# Patient Record
Sex: MALE | Race: ASIAN | URBAN - METROPOLITAN AREA
[De-identification: names, ages, dates, MRNs, and addresses within clinical notes are randomized per-mention and may not be internally consistent; named-entity substitution may affect disease eponyms.]

---

## 2017-02-03 ENCOUNTER — OFFICE VISIT (OUTPATIENT)
Dept: FAMILY MEDICINE | Facility: CLINIC | Age: 52
End: 2017-02-03
Payer: COMMERCIAL

## 2017-02-03 VITALS
WEIGHT: 196 LBS | DIASTOLIC BLOOD PRESSURE: 90 MMHG | OXYGEN SATURATION: 98 % | HEIGHT: 70 IN | TEMPERATURE: 97.9 F | SYSTOLIC BLOOD PRESSURE: 138 MMHG | HEART RATE: 70 BPM | BODY MASS INDEX: 28.06 KG/M2

## 2017-02-03 DIAGNOSIS — Z23 NEED FOR PROPHYLACTIC VACCINATION AND INOCULATION AGAINST INFLUENZA: ICD-10-CM

## 2017-02-03 DIAGNOSIS — K21.9 GASTROESOPHAGEAL REFLUX DISEASE WITHOUT ESOPHAGITIS: ICD-10-CM

## 2017-02-03 DIAGNOSIS — I10 ESSENTIAL HYPERTENSION WITH GOAL BLOOD PRESSURE LESS THAN 140/90: Primary | ICD-10-CM

## 2017-02-03 PROCEDURE — 90686 IIV4 VACC NO PRSV 0.5 ML IM: CPT | Performed by: FAMILY MEDICINE

## 2017-02-03 PROCEDURE — 99213 OFFICE O/P EST LOW 20 MIN: CPT | Mod: 25 | Performed by: FAMILY MEDICINE

## 2017-02-03 PROCEDURE — 90471 IMMUNIZATION ADMIN: CPT | Performed by: FAMILY MEDICINE

## 2017-02-03 RX ORDER — ATENOLOL 50 MG/1
50 TABLET ORAL DAILY
Qty: 90 TABLET | Refills: 2 | Status: SHIPPED | OUTPATIENT
Start: 2017-02-03 | End: 2017-08-17

## 2017-02-03 RX ORDER — AMLODIPINE BESYLATE 5 MG/1
7.5 TABLET ORAL DAILY
Qty: 135 TABLET | Refills: 0 | Status: SHIPPED | OUTPATIENT
Start: 2017-02-03 | End: 2017-05-11

## 2017-02-03 NOTE — PROGRESS NOTES
SUBJECTIVE:                                                    Willard Bartlett is a 51 year old male who presents to clinic today for the following health issues:      Hypertension Follow-up      Outpatient blood pressures are not being checked.    Low Salt Diet: no added salt       Amount of exercise or physical activity: 2-3 days/week for an average of greater than 60 minutes    Problems taking medications regularly: No    Medication side effects: none    Diet: regular (no restrictions)    Gerd is stable   The patient denies abdominal or flank pain, anorexia, nausea or vomiting, dysphagia, change in bowel habits or black or bloody stools.      Problem list and histories reviewed & adjusted, as indicated.  Additional history: as documented    Patient Active Problem List   Diagnosis     Hypertriglyceridemia     Neurocysticercosis     CARDIOVASCULAR SCREENING; LDL GOAL LESS THAN 130     Ex-smoker     DDD (degenerative disc disease), lumbar     Essential hypertension with goal blood pressure less than 140/90     Impaired glucose tolerance     Past Surgical History   Procedure Laterality Date     No history of surgery       Colonoscopy with co2 insufflation N/A 9/7/2016     Procedure: COLONOSCOPY WITH CO2 INSUFFLATION;  Surgeon: Duane, William Charles, MD;  Location:  OR       Social History   Substance Use Topics     Smoking status: Former Smoker -- 0.50 packs/day     Types: Cigarettes     Quit date: 03/12/2012     Smokeless tobacco: Never Used     Alcohol Use: No      Comment: prior use of 2-3 whiskey 4 days a week, quit 8/2012     Family History   Problem Relation Age of Onset     C.A.D. Father 68     MI, dZoie 68     Hypertension Father      Hypertension Mother      CEREBROVASCULAR DISEASE Mother      Hypertension Brother      CANCER Brother      Hypertension Brother          Current Outpatient Prescriptions   Medication Sig Dispense Refill     amLODIPine (NORVASC) 5 MG tablet Take 1.5 tablets (7.5 mg) by mouth  daily 135 tablet 0     atenolol (TENORMIN) 50 MG tablet Take 1 tablet (50 mg) by mouth daily 90 tablet 2     omeprazole (PRILOSEC) 20 MG CR capsule Take 1 capsule (20 mg) by mouth daily 90 capsule 0     [DISCONTINUED] amLODIPine (NORVASC) 5 MG tablet 5 mg daily, daily- 90 tablet 3     [DISCONTINUED] atenolol (TENORMIN) 50 MG tablet Take 1 tablet (50 mg) by mouth daily 90 tablet 2     No Known Allergies  Recent Labs   Lab Test  08/01/16   0825  06/24/16   0848  06/14/16   1317  09/15/15   1043  02/23/15   1406  09/25/14   0911  12/09/13   0709   05/12/09   1415   A1C   --    --   6.3*   --    --    --    --    --   5.8   LDL   --   86   --   83   --   111  80   < >   --    HDL   --   28*   --   30*   --   33*  23*   < >   --    TRIG   --   134   --   103   --   97  68   < >   --    ALT  40   --    --    --   34   --   41   < >  22   CR   --    --   0.93  1.08  1.17  0.97  0.99   < >   --    GFRESTIMATED   --    --   86  73  66  82  81   < >   --    GFRESTBLACK   --    --   >90   GFR Calc    88  80  >90   GFR Calc    >90   < >   --    POTASSIUM   --    --   4.6  4.5  3.8  4.4  4.3   < >   --    TSH   --    --    --    --   1.56   --    --    --    --     < > = values in this interval not displayed.      BP Readings from Last 3 Encounters:   02/03/17 138/90   09/07/16 116/88   08/01/16 136/88    Wt Readings from Last 3 Encounters:   02/03/17 196 lb (88.905 kg)   08/01/16 196 lb (88.905 kg)   06/14/16 192 lb (87.091 kg)                  Labs reviewed in EPIC  Problem list, Medication list, Allergies, and Medical/Social/Surgical histories reviewed in [facility] and updated as appropriate.    ROS:  C: NEGATIVE for fever, chills, change in weight  E/M: NEGATIVE for ear, mouth and throat problems  R: NEGATIVE for significant cough or SOB  CV: NEGATIVE for chest pain, palpitations or peripheral edema  GI: NEGATIVE for nausea, abdominal pain, heartburn, or change in bowel habits    OBJECTIVE:          "                                           /90 mmHg  Pulse 70  Temp(Src) 97.9  F (36.6  C) (Oral)  Ht 5' 10\" (1.778 m)  Wt 196 lb (88.905 kg)  BMI 28.12 kg/m2  SpO2 98%  Body mass index is 28.12 kg/(m^2).  GENERAL: healthy, alert and no distress  NECK: no adenopathy, no asymmetry, masses, or scars and thyroid normal to palpation  RESP: lungs clear to auscultation - no rales, rhonchi or wheezes  CV: regular rate and rhythm, normal S1 S2, no S3 or S4, no murmur, click or rub, no peripheral edema and peripheral pulses strong  ABDOMEN: soft, nontender, no hepatosplenomegaly, no masses and bowel sounds normal  MS: no gross musculoskeletal defects noted, no edema    Diagnostic Test Results:       ASSESSMENT/PLAN:                                                      1. Essential hypertension with goal blood pressure less than 140/90  Borderline High  - amLODIPine (NORVASC) 5 MG tablet; Take 1.5 tablets (7.5 mg) by mouth daily  Dispense: 135 tablet; Refill: 0  - atenolol (TENORMIN) 50 MG tablet; Take 1 tablet (50 mg) by mouth daily  Dispense: 90 tablet; Refill: 2  Advised increase amlodipine  SEE [facility] care orders  The potential side effects of this medication have been discussed with the patient.  Call if any significant problems with these are experienced.  Follow up ancillary BP check 2 weeks  2. Gastroesophageal reflux disease without esophagitis  Stable     3. Need for prophylactic vaccination and inoculation against influenza  done  - FLU VAC, SPLIT VIRUS IM > 3 YO (QUADRIVALENT) [96268]  - Vaccine Administration, Initial [71631]  Laure Doan MD  [facility]    "

## 2017-02-03 NOTE — NURSING NOTE
"Chief Complaint   Patient presents with     Recheck Medication     Hypertension       Initial /108 mmHg  Pulse 70  Temp(Src) 97.9  F (36.6  C) (Oral)  Ht 5' 10\" (1.778 m)  Wt 196 lb (88.905 kg)  BMI 28.12 kg/m2  SpO2 98% Estimated body mass index is 28.12 kg/(m^2) as calculated from the following:    Height as of this encounter: 5' 10\" (1.778 m).    Weight as of this encounter: 196 lb (88.905 kg).  BP completed using cuff size: regular right arm    Hortencia Joyce MA      "

## 2017-02-03 NOTE — PROGRESS NOTES
Injectable Influenza Immunization Documentation    1.  Is the person to be vaccinated sick today?  No    2. Does the person to be vaccinated have an allergy to eggs or to a component of the vaccine?  No    3. Has the person to be vaccinated today ever had a serious reaction to influenza vaccine in the past?  No    4. Has the person to be vaccinated ever had Guillain-[place] syndrome?  No     Form completed by Kilo Meyers. MA

## 2017-02-03 NOTE — MR AVS SNAPSHOT
"              After Visit Summary   2/3/2017    Willard Bartlett    MRN: [id number]           Patient Information     Date Of Birth          1965        Visit Information        Provider Department      2/3/2017 9:20 AM Laure Doan MD [facility]        Today's Diagnoses     Need for prophylactic vaccination and inoculation against influenza    -  1     Essential hypertension with goal blood pressure less than 140/90         Abdominal pain, epigastric            Follow-ups after your visit        Who to contact     If you have questions or need follow up information about today's clinic visit or your schedule please contact [facility] directly at [phone].  Normal or non-critical lab and imaging results will be communicated to you by MyChart, letter or phone within 4 business days after the clinic has received the results. If you do not hear from us within 7 days, please contact the clinic through Comcas[employer] or phone. If you have a critical or abnormal lab result, we will notify you by phone as soon as possible.  Submit refill requests through Teachbase or call your pharmacy and they will forward the refill request to us. Please allow 3 business days for your refill to be completed.          Additional Information About Your Visit        MyChart Information     [employer] gives you secure access to your electronic health record. If you see a primary care provider, you can also send messages to your care team and make appointments. If you have questions, please call your primary care clinic.  If you do not have a primary care provider, please call [phone] and they will assist you.        Care EveryWhere ID     This is your Care EveryWhere ID. This could be used by other organizations to access your [facility] medical records  [id number]        Your Vitals Were     Pulse Temperature Height BMI (Body Mass Index) Pulse Oximetry       70 97.9  F (36.6  C) (Oral) 5' 10\" (1.778 m) 28.12 " kg/m2 98%        Blood Pressure from Last 3 Encounters:   02/03/17 136/108   09/07/16 116/88   08/01/16 136/88    Weight from Last 3 Encounters:   02/03/17 196 lb (88.905 kg)   08/01/16 196 lb (88.905 kg)   06/14/16 192 lb (87.091 kg)              Today, you had the following     No orders found for display         Today's Medication Changes          These changes are accurate as of: 2/3/17  9:33 AM.  If you have any questions, ask your nurse or doctor.               These medicines have changed or have updated prescriptions.        Dose/Directions    amLODIPine 5 MG tablet   Commonly known as:  NORVASC   This may have changed:    - how much to take  - how to take this  - when to take this  - additional instructions   Used for:  Essential hypertension with goal blood pressure less than 140/90   Changed by:  Laure Doan MD        Dose:  7.5 mg   Take 1.5 tablets (7.5 mg) by mouth daily   Quantity:  135 tablet   Refills:  0            Where to get your medicines      These medications were sent to [facility] - [place], [facility] [address]     Phone:  [phone]    - amLODIPine 5 MG tablet  - atenolol 50 MG tablet  - omeprazole 20 MG CR capsule             Primary Care Provider Office Phone # Fax #    Laure Doan [phone]       [address] [address]        Thank you!     Thank you for choosing [facility]  for your care. Our goal is always to provide you with excellent care. Hearing back from our patients is one way we can continue to improve our services. Please take a few minutes to complete the written survey that you may receive in the mail after your visit with us. Thank you!             Your Updated Medication List - Protect others around you: Learn how to safely use, store and throw away your medicines at www.disposemymeds.org.          This list is accurate  as of: 2/3/17  9:33 AM.  Always use your most recent med list.                   Brand Name Dispense Instructions for use    amLODIPine 5 MG tablet    NORVASC    135 tablet    Take 1.5 tablets (7.5 mg) by mouth daily       atenolol 50 MG tablet    TENORMIN    90 tablet    Take 1 tablet (50 mg) by mouth daily       omeprazole 20 MG CR capsule    priLOSEC    90 capsule    Take 1 capsule (20 mg) by mouth daily

## 2017-05-11 DIAGNOSIS — I10 ESSENTIAL HYPERTENSION WITH GOAL BLOOD PRESSURE LESS THAN 140/90: ICD-10-CM

## 2017-05-11 RX ORDER — AMLODIPINE BESYLATE 5 MG/1
TABLET ORAL
Qty: 135 TABLET | Refills: 1 | Status: SHIPPED | OUTPATIENT
Start: 2017-05-11 | End: 2017-08-17

## 2017-05-11 NOTE — TELEPHONE ENCOUNTER
amLODIPine (NORVASC) 5 MG tablet     Last Written Prescription Date: 2/3/17  Last Fill Quantity: 135, # refills: 0    Last Office Visit with [facility], [facility] or [facility] prescribing provider:  2/3/17   Future Office Visit:        BP Readings from Last 3 Encounters:   02/03/17 138/90   09/07/16 116/88   08/01/16 136/88

## 2017-05-11 NOTE — TELEPHONE ENCOUNTER
Reason for Call:  Medication or medication refill:    Do you use a [facility]?  Name of the pharmacy and phone number for the current request:  [facility] [phone]    Name of the medication requested: Patient informs he's traveling to [place] on Sunday 05/14/17 for a couple of months and would like refills for the duration of his overseas stay.    Other request: Na    Can we leave a detailed message on this number? YES    Phone number patient can be reached at: Work number on file:  [phone] (work)    Best Time: anytime    Call taken on 5/11/2017 at 9:14 AM by Shyla Connolly

## 2017-05-11 NOTE — TELEPHONE ENCOUNTER
Prescription approved per [facility] Refill Protocol.  Hanny Juárez, RN - BC

## 2017-08-11 ENCOUNTER — TELEPHONE (OUTPATIENT)
Dept: FAMILY MEDICINE | Facility: CLINIC | Age: 52
End: 2017-08-11

## 2017-08-11 NOTE — TELEPHONE ENCOUNTER
Panel Management Review      Patient has the following on his problem list:     Hypertension   Last three blood pressure readings:  BP Readings from Last 3 Encounters:   02/03/17 138/90   09/07/16 116/88   08/01/16 136/88     Blood pressure: Passed    HTN Guidelines:  Age 18-59 BP range:  Less than 140/90  Age 60-85 with Diabetes:  Less than 140/90  Age 60-85 without Diabetes:  less than 150/90        Composite cancer screening  Chart review shows that this patient is due/due soon for the following None  Summary:    Patient is due/failing the following:   none    Action needed:   Routed to provider for review.    Type of outreach:    None, routed to provider for review.    Questions for provider review:    None                                                                                                                                    Odilon Gandara       Chart routed to Care Team .

## 2017-08-11 NOTE — LETTER
August 11, 2017          Willard Bartlett,  [address] [facility] [address]        Dear Willard Bartlett      Monitoring and managing your preventative and chronic health conditions are very important to us. Our records indicate that you have not scheduled for Appointment with your provider  which was recommended by Dr. Doan      If you have received your health care elsewhere, please call the clinic so the information can be documented in your chart.    Please call [phone] or message us through your [employer] account to schedule an appointment or provide information for your chart.     Feel free to contact us if you have any questions or concerns!    I look forward to seeing you and working with you on your health care needs.     Sincerely,         Laure Doan / MD

## 2017-08-17 DIAGNOSIS — I10 ESSENTIAL HYPERTENSION WITH GOAL BLOOD PRESSURE LESS THAN 140/90: ICD-10-CM

## 2017-08-17 NOTE — TELEPHONE ENCOUNTER
atenolol (TENORMIN) 50 MG tablet  Last Written Prescription Date: 2/3/2017  Last Fill Quantity: 90, # refills: 2  Last Office Visit with [facility], [facility] or  Health prescribing provider: 2/3/2017  Next 5 appointments (look out 90 days)     Aug 22, 2017  3:20 PM CDT   Office Visit with Laure Doan MD   [facility] ([facility]    [address] [facility] [address]   [phone]                   Potassium   Date Value Ref Range Status   06/14/2016 4.6 3.4 - 5.3 mmol/L Final     Creatinine   Date Value Ref Range Status   06/14/2016 0.93 0.66 - 1.25 mg/dL Final     BP Readings from Last 3 Encounters:   02/03/17 138/90   09/07/16 116/88   08/01/16 136/88     amLODIPine (NORVASC) 5 MG tablet  Last Written Prescription Date: 5/11/2017  Last Fill Quantity: 135, # refills: 1    Last Office Visit with [facility], [facility] or  Health prescribing provider:  2/3/2017   Future Office Visit:    BP Readings from Last 3 Encounters:   02/03/17 138/90   09/07/16 116/88   08/01/16 136/88

## 2017-08-17 NOTE — TELEPHONE ENCOUNTER
Reason for Call:  Medication or medication refill:    Do you use a [facility]?  Name of the pharmacy and phone number for the current request:  [facility] listed above    Name of the medication requested: Atenolol, amlodipine    Other request: patient only has 3 days worth of medications left.    Can we leave a detailed message on this number? YES    Phone number patient can be reached at: Work number on file:  [phone] (work)    Best Time: any time    Call taken on 8/17/2017 at 1:45 PM by Alessandra Burris

## 2017-08-18 RX ORDER — ATENOLOL 50 MG/1
50 TABLET ORAL DAILY
Qty: 90 TABLET | Refills: 1 | Status: SHIPPED | OUTPATIENT
Start: 2017-08-18 | End: 2017-08-22

## 2017-08-18 RX ORDER — AMLODIPINE BESYLATE 5 MG/1
TABLET ORAL
Qty: 135 TABLET | Refills: 1 | Status: SHIPPED | OUTPATIENT
Start: 2017-08-18 | End: 2017-08-22

## 2017-08-18 NOTE — TELEPHONE ENCOUNTER
Prescription approved per [facility] Refill Protocol.  Hanny Juárez, RN - BC

## 2017-08-22 ENCOUNTER — OFFICE VISIT (OUTPATIENT)
Dept: FAMILY MEDICINE | Facility: CLINIC | Age: 52
End: 2017-08-22
Payer: COMMERCIAL

## 2017-08-22 VITALS
DIASTOLIC BLOOD PRESSURE: 88 MMHG | OXYGEN SATURATION: 97 % | SYSTOLIC BLOOD PRESSURE: 136 MMHG | BODY MASS INDEX: 27.77 KG/M2 | HEIGHT: 70 IN | WEIGHT: 194 LBS | TEMPERATURE: 98 F | HEART RATE: 69 BPM

## 2017-08-22 DIAGNOSIS — I10 ESSENTIAL HYPERTENSION WITH GOAL BLOOD PRESSURE LESS THAN 140/90: ICD-10-CM

## 2017-08-22 DIAGNOSIS — R73.02 IMPAIRED GLUCOSE TOLERANCE: Primary | ICD-10-CM

## 2017-08-22 LAB — HBA1C MFR BLD: 6 % (ref 4.3–6)

## 2017-08-22 PROCEDURE — 99213 OFFICE O/P EST LOW 20 MIN: CPT | Performed by: FAMILY MEDICINE

## 2017-08-22 PROCEDURE — 36415 COLL VENOUS BLD VENIPUNCTURE: CPT | Performed by: FAMILY MEDICINE

## 2017-08-22 PROCEDURE — 80048 BASIC METABOLIC PNL TOTAL CA: CPT | Performed by: FAMILY MEDICINE

## 2017-08-22 PROCEDURE — 83036 HEMOGLOBIN GLYCOSYLATED A1C: CPT | Performed by: FAMILY MEDICINE

## 2017-08-22 RX ORDER — AMLODIPINE BESYLATE 5 MG/1
TABLET ORAL
Qty: 135 TABLET | Refills: 3 | Status: SHIPPED | OUTPATIENT
Start: 2017-08-22 | End: 2018-03-13

## 2017-08-22 RX ORDER — ATENOLOL 50 MG/1
50 TABLET ORAL DAILY
Qty: 90 TABLET | Refills: 1 | Status: SHIPPED | OUTPATIENT
Start: 2017-08-22 | End: 2018-03-13

## 2017-08-22 NOTE — MR AVS SNAPSHOT
After Visit Summary   8/22/2017    Willard Bartlett    MRN: [id number]           Patient Information     Date Of Birth          1965        Visit Information        Provider Department      8/22/2017 3:20 PM Laure Doan MD [facility]        Today's Diagnoses     Impaired glucose tolerance    -  1    Essential hypertension with goal blood pressure less than 140/90          Care Instructions    [facility]    If you have any questions regarding to your visit please contact your care team:       Team Red:   Clinic Hours Telephone Number   Dr. Khadijah Shepherd, NP   7am-7pm  Monday - Thursday   7am-5pm  Fridays  [phone]  (Appointment scheduling available 24/7)    Questions about your visit?   Team Line  [phone]   [facility] [place] [facility] 11am-9pm Monday-Friday Saturday-Sunday- 9am-5pm   [place] - 5pm-9pm Monday-Friday Saturday-Sunday- 9am-5pm  [phone] - [place]   [phone] - [place]       What options do I have for visits at the clinic other than the traditional office visit?  To expand how we care for you, many of our providers are utilizing electronic visits (e-visits) and telephone visits, when medically appropriate, for interactions with their patients rather than a visit in the clinic.   We also offer nurse visits for many medical concerns. Just like any other service, we will bill your insurance company for this type of visit based on time spent on the phone with your provider. Not all insurance companies cover these visits. Please check with your medical insurance if this type of visit is covered. You will be responsible for any charges that are not paid by your insurance.      E-visits via [address]:  generally incur a $35.00 fee.  Telephone visits:  Time spent on the phone: *charged based on time that is spent on the phone in increments of 10 minutes. Estimated  "cost:   5-10 mins $30.00   11-20 mins. $59.00   21-30 mins. $85.00     Use [employer] (secure email communication and access to your chart) to send your primary care provider a message or make an appointment. Ask someone on your Team how to sign up for [employer].  For a Price Quote for your services, please call our [employer] Line at [phone].      As always, Thank you for trusting us with your health care needs!            Follow-ups after your visit        Who to contact     If you have questions or need follow up information about today's clinic visit or your schedule please contact [facility] directly at [phone].  Normal or non-critical lab and imaging results will be communicated to you by [employer], letter or phone within 4 business days after the clinic has received the results. If you do not hear from us within 7 days, please contact the clinic through [employer] or phone. If you have a critical or abnormal lab result, we will notify you by phone as soon as possible.  Submit refill requests through Netlog or call your pharmacy and they will forward the refill request to us. Please allow 3 business days for your refill to be completed.          Additional Information About Your Visit        [employer] Information     Netlog gives you secure access to your electronic health record. If you see a primary care provider, you can also send messages to your care team and make appointments. If you have questions, please call your primary care clinic.  If you do not have a primary care provider, please call [phone] and they will assist you.        Care EveryWhere ID     This is your Care EveryWhere ID. This could be used by other organizations to access your [facility] medical records  [id number]        Your Vitals Were     Pulse Temperature Height Pulse Oximetry BMI (Body Mass Index)       69 98  F (36.7  C) (Oral) 5' 10\" (1.778 m) 97% 27.84 kg/m2        Blood Pressure from Last 3 Encounters: "   08/22/17 136/88   02/03/17 138/90   09/07/16 116/88    Weight from Last 3 Encounters:   08/22/17 194 lb (88 kg)   02/03/17 196 lb (88.9 kg)   08/01/16 196 lb (88.9 kg)              We Performed the Following     Basic metabolic panel     Hemoglobin A1c          Where to get your medicines      These medications were sent to [facility] - [place] [facility] - [address] [facility]  [address] [facility] [address], [place] [address]     Phone:  [phone]     amLODIPine 5 MG tablet    atenolol 50 MG tablet          Primary Care Provider Office Phone # Fax #    Laure Doan [phone] [address] [facility] [address]        Equal Access to Services     [facility]: Hadii davon knowles hadasho Soomaali, waaxda luqadaha, qaybta kaalmada adeegyada, lesli malik . So [facility] [phone].    ATENCIÓN: Si habla español, tiene a etienne disposición servicios gratuitos de asistencia lingüística. José Luis al [phone].    We comply with applicable federal civil rights laws and [place] laws. We do not discriminate on the basis of race, color, national origin, age, disability sex, sexual orientation or gender identity.            Thank you!     Thank you for choosing [facility]  for your care. Our goal is always to provide you with excellent care. Hearing back from our patients is one way we can continue to improve our services. Please take a few minutes to complete the written survey that you may receive in the mail after your visit with us. Thank you!             Your Updated Medication List - Protect others around you: Learn how to safely use, store and throw away your medicines at www.disposemymeds.org.          This list is accurate as of: 8/22/17  4:15 PM.  Always use your most recent med list.                   Brand Name Dispense Instructions for use Diagnosis    amLODIPine 5 MG tablet    NORVASC    135 tablet    TAKE ONE AND ONE-HALF TABLETS BY  MOUTH ONCE DAILY    Essential hypertension with goal blood pressure less than 140/90       atenolol 50 MG tablet    TENORMIN    90 tablet    Take 1 tablet (50 mg) by mouth daily    Essential hypertension with goal blood pressure less than 140/90       omeprazole 20 MG CR capsule    priLOSEC    90 capsule    Take 1 capsule (20 mg) by mouth daily

## 2017-08-22 NOTE — NURSING NOTE
"Chief Complaint   Patient presents with     Hypertension       Initial BP (!) 136/94  Pulse 69  Temp 98  F (36.7  C) (Oral)  Ht 5' 10\" (1.778 m)  Wt 194 lb (88 kg)  SpO2 97%  BMI 27.84 kg/m2 Estimated body mass index is 27.84 kg/(m^2) as calculated from the following:    Height as of this encounter: 5' 10\" (1.778 m).    Weight as of this encounter: 194 lb (88 kg).  Medication Reconciliation: complete   Lis MONTEZ MA      "

## 2017-08-22 NOTE — PATIENT INSTRUCTIONS
[facility]    If you have any questions regarding to your visit please contact your care team:       Team Red:   Clinic Hours Telephone Number   Dr. Khadijah Shepherd, NP   7am-7pm  Monday - Thursday   7am-5pm  Fridays  [phone]  (Appointment scheduling available 24/7)    Questions about your visit?   Team Line  [phone]   [facility] [place] [facility] 11am-9pm Monday-Friday Saturday-Sunday- 9am-5pm   [place] - 5pm-9pm Monday-Friday Saturday-Sunday- 9am-5pm  [phone] - [place]   [phone] - [place]       What options do I have for visits at the clinic other than the traditional office visit?  To expand how we care for you, many of our providers are utilizing electronic visits (e-visits) and telephone visits, when medically appropriate, for interactions with their patients rather than a visit in the clinic.   We also offer nurse visits for many medical concerns. Just like any other service, we will bill your insurance company for this type of visit based on time spent on the phone with your provider. Not all insurance companies cover these visits. Please check with your medical insurance if this type of visit is covered. You will be responsible for any charges that are not paid by your insurance.      E-visits via Yesmail:  generally incur a $35.00 fee.  Telephone visits:  Time spent on the phone: *charged based on time that is spent on the phone in increments of 10 minutes. Estimated cost:   5-10 mins $30.00   11-20 mins. $59.00   21-30 mins. $85.00     Use Avisenat (secure email communication and access to your chart) to send your primary care provider a message or make an appointment. Ask someone on your Team how to sign up for Yesmail.  For a Price Quote for your services, please call our Consumer Price Line at [phone].      As always, Thank you for trusting us with your health care needs!

## 2017-08-22 NOTE — PROGRESS NOTES
SUBJECTIVE:   Willard Bartlett is a 52 year old male who presents to clinic today for the following health issues:      Hypertension Follow-up      Outpatient blood pressures are being checked at clinic [facility].  Results are normal range.    Low Salt Diet: low salt        Amount of exercise or physical activity: None    Problems taking medications regularly: No    Medication side effects: none  Diet: low salt          Problem list and histories reviewed & adjusted, as indicated.  Additional history: as documented    Patient Active Problem List   Diagnosis     Hypertriglyceridemia     Neurocysticercosis     CARDIOVASCULAR SCREENING; LDL GOAL LESS THAN 130     Ex-smoker     DDD (degenerative disc disease), lumbar     Essential hypertension with goal blood pressure less than 140/90     Impaired glucose tolerance     Past Surgical History:   Procedure Laterality Date     COLONOSCOPY WITH CO2 INSUFFLATION N/A 9/7/2016    Procedure: COLONOSCOPY WITH CO2 INSUFFLATION;  Surgeon: Duane, William Charles, MD;  Location:  OR     NO HISTORY OF SURGERY         Social History   Substance Use Topics     Smoking status: Former Smoker     Packs/day: 0.50     Types: Cigarettes     Quit date: 3/12/2012     Smokeless tobacco: Never Used     Alcohol use No      Comment: prior use of 2-3 whiskey 4 days a week, quit 8/2012     Family History   Problem Relation Age of Onset     C.A.D. Father 68     MI, d. 68     Hypertension Father      Hypertension Mother      CEREBROVASCULAR DISEASE Mother      Hypertension Brother      CANCER Brother      Hypertension Brother          Current Outpatient Prescriptions   Medication Sig Dispense Refill     amLODIPine (NORVASC) 5 MG tablet TAKE ONE AND ONE-HALF TABLETS BY MOUTH ONCE DAILY 135 tablet 3     atenolol (TENORMIN) 50 MG tablet Take 1 tablet (50 mg) by mouth daily 90 tablet 1     [DISCONTINUED] amLODIPine (NORVASC) 5 MG tablet TAKE ONE AND ONE-HALF TABLETS BY MOUTH ONCE DAILY 135 tablet 1      "[DISCONTINUED] atenolol (TENORMIN) 50 MG tablet Take 1 tablet (50 mg) by mouth daily 90 tablet 1     omeprazole (PRILOSEC) 20 MG CR capsule Take 1 capsule (20 mg) by mouth daily (Patient not taking: Reported on 8/22/2017) 90 capsule 0     No Known Allergies  Recent Labs   Lab Test  08/01/16   0825  06/24/16   0848  06/14/16   1317  09/15/15   1043  02/23/15   1406  09/25/14   0911  12/09/13   0709   A1C   --    --   6.3*   --    --    --    --    LDL   --   86   --   83   --   111  80   HDL   --   28*   --   30*   --   33*  23*   TRIG   --   134   --   103   --   97  68   ALT  40   --    --    --   34   --   41   CR   --    --   0.93  1.08  1.17  0.97  0.99   GFRESTIMATED   --    --   86  73  66  82  81   GFRESTBLACK   --    --   >90   GFR Calc    88  80  >90   GFR Calc    >90   POTASSIUM   --    --   4.6  4.5  3.8  4.4  4.3   TSH   --    --    --    --   1.56   --    --       BP Readings from Last 3 Encounters:   08/22/17 136/88   02/03/17 138/90   09/07/16 116/88    Wt Readings from Last 3 Encounters:   08/22/17 194 lb (88 kg)   02/03/17 196 lb (88.9 kg)   08/01/16 196 lb (88.9 kg)                  Labs reviewed in EPIC          Reviewed and updated as needed this visit by clinical staffTobacco  Meds  Med Hx  Surg Hx  Fam Hx  Soc Hx      Reviewed and updated as needed this visit by Provider         ROS:  C: NEGATIVE for fever, chills, change in weight  E/M: NEGATIVE for ear, mouth and throat problems  R: NEGATIVE for significant cough or SOB  CV: NEGATIVE for chest pain, palpitations or peripheral edema    OBJECTIVE:     /88  Pulse 69  Temp 98  F (36.7  C) (Oral)  Ht 5' 10\" (1.778 m)  Wt 194 lb (88 kg)  SpO2 97%  BMI 27.84 kg/m2  Body mass index is 27.84 kg/(m^2).  GENERAL: healthy, alert and no distress  NECK: no adenopathy, no asymmetry, masses, or scars and thyroid normal to palpation  RESP: lungs clear to auscultation - no rales, rhonchi or wheezes  CV: regular " rate and rhythm, normal S1 S2, no S3 or S4, no murmur, click or rub, no peripheral edema and peripheral pulses strong  ABDOMEN: soft, nontender, no hepatosplenomegaly, no masses and bowel sounds normal  MS: no gross musculoskeletal defects noted, no edema    Diagnostic Test Results:  Pending     ASSESSMENT/PLAN:         1. Essential hypertension with goal blood pressure less than 140/90  controlled  - amLODIPine (NORVASC) 5 MG tablet; TAKE ONE AND ONE-HALF TABLETS BY MOUTH ONCE DAILY  Dispense: 135 tablet; Refill: 3  - atenolol (TENORMIN) 50 MG tablet; Take 1 tablet (50 mg) by mouth daily  Dispense: 90 tablet; Refill: 1  - Basic metabolic panel    2. Impaired glucose tolerance  done  - Hemoglobin A1c    Follow up 6 months  DASH diet    Laure Doan MD  [facility]

## 2017-08-23 LAB
ANION GAP SERPL CALCULATED.3IONS-SCNC: 9 MMOL/L (ref 3–14)
BUN SERPL-MCNC: 16 MG/DL (ref 7–30)
CALCIUM SERPL-MCNC: 9.1 MG/DL (ref 8.5–10.1)
CHLORIDE SERPL-SCNC: 103 MMOL/L (ref 94–109)
CO2 SERPL-SCNC: 28 MMOL/L (ref 20–32)
CREAT SERPL-MCNC: 0.88 MG/DL (ref 0.66–1.25)
GFR SERPL CREATININE-BSD FRML MDRD: >90 ML/MIN/1.7M2
GLUCOSE SERPL-MCNC: 112 MG/DL (ref 70–99)
POTASSIUM SERPL-SCNC: 4.3 MMOL/L (ref 3.4–5.3)
SODIUM SERPL-SCNC: 140 MMOL/L (ref 133–144)

## 2018-03-13 ENCOUNTER — OFFICE VISIT (OUTPATIENT)
Dept: FAMILY MEDICINE | Facility: CLINIC | Age: 53
End: 2018-03-13
Payer: COMMERCIAL

## 2018-03-13 VITALS
OXYGEN SATURATION: 98 % | HEIGHT: 70 IN | SYSTOLIC BLOOD PRESSURE: 136 MMHG | HEART RATE: 74 BPM | RESPIRATION RATE: 14 BRPM | WEIGHT: 197 LBS | BODY MASS INDEX: 28.2 KG/M2 | TEMPERATURE: 97 F | DIASTOLIC BLOOD PRESSURE: 88 MMHG

## 2018-03-13 DIAGNOSIS — I10 ESSENTIAL HYPERTENSION WITH GOAL BLOOD PRESSURE LESS THAN 140/90: Primary | ICD-10-CM

## 2018-03-13 DIAGNOSIS — Z23 NEED FOR PROPHYLACTIC VACCINATION AND INOCULATION AGAINST INFLUENZA: ICD-10-CM

## 2018-03-13 DIAGNOSIS — Z87.891 EX-SMOKER: ICD-10-CM

## 2018-03-13 PROCEDURE — 99213 OFFICE O/P EST LOW 20 MIN: CPT | Mod: 25 | Performed by: FAMILY MEDICINE

## 2018-03-13 PROCEDURE — 90471 IMMUNIZATION ADMIN: CPT | Performed by: FAMILY MEDICINE

## 2018-03-13 PROCEDURE — 90686 IIV4 VACC NO PRSV 0.5 ML IM: CPT | Performed by: FAMILY MEDICINE

## 2018-03-13 RX ORDER — AMLODIPINE BESYLATE 5 MG/1
TABLET ORAL
Qty: 135 TABLET | Refills: 3 | Status: SHIPPED | OUTPATIENT
Start: 2018-03-13 | End: 2019-01-08

## 2018-03-13 RX ORDER — ATENOLOL 50 MG/1
50 TABLET ORAL DAILY
Qty: 90 TABLET | Refills: 3 | Status: SHIPPED | OUTPATIENT
Start: 2018-03-13 | End: 2019-01-08

## 2018-03-13 NOTE — NURSING NOTE
"Chief Complaint   Patient presents with     Hypertension     Flu Shot       Initial /90  Pulse 74  Temp 97  F (36.1  C)  Resp 14  Ht 5' 10\" (1.778 m)  Wt 197 lb (89.4 kg)  SpO2 98%  BMI 28.27 kg/m2 Estimated body mass index is 28.27 kg/(m^2) as calculated from the following:    Height as of this encounter: 5' 10\" (1.778 m).    Weight as of this encounter: 197 lb (89.4 kg).  Medication Reconciliation: complete     Kilo Meyers. MA      "

## 2018-03-13 NOTE — MR AVS SNAPSHOT
"              After Visit Summary   3/13/2018    Willard Bartlett    MRN: [id number]           Patient Information     Date Of Birth          1965        Visit Information        Provider Department      3/13/2018 5:00 PM Laure Doan MD [facility]        Today's Diagnoses     Essential hypertension with goal blood pressure less than 140/90    -  1    Need for prophylactic vaccination and inoculation against influenza        Ex-smoker           Follow-ups after your visit        Who to contact     If you have questions or need follow up information about today's clinic visit or your schedule please contact [facility] directly at [phone].  Normal or non-critical lab and imaging results will be communicated to you by Grabithart, letter or phone within 4 business days after the clinic has received the results. If you do not hear from us within 7 days, please contact the clinic through Grabithart or phone. If you have a critical or abnormal lab result, we will notify you by phone as soon as possible.  Submit refill requests through ProtAb or call your pharmacy and they will forward the refill request to us. Please allow 3 business days for your refill to be completed.          Additional Information About Your Visit        MyChart Information     ProtAb gives you secure access to your electronic health record. If you see a primary care provider, you can also send messages to your care team and make appointments. If you have questions, please call your primary care clinic.  If you do not have a primary care provider, please call [phone] and they will assist you.        Care EveryWhere ID     This is your Care EveryWhere ID. This could be used by other organizations to access your [place] medical records  [id number]        Your Vitals Were     Pulse Temperature Respirations Height Pulse Oximetry BMI (Body Mass Index)    74 97  F (36.1  C) 14 5' 10\" (1.778 m) 98% 28.27 kg/m2    "    Blood Pressure from Last 3 Encounters:   03/13/18 134/90   08/22/17 136/88   02/03/17 138/90    Weight from Last 3 Encounters:   03/13/18 197 lb (89.4 kg)   08/22/17 194 lb (88 kg)   02/03/17 196 lb (88.9 kg)              We Performed the Following     FLU VAC, SPLIT VIRUS IM > 3 YO (QUADRIVALENT) [47109]     Vaccine Administration, Initial [65137]          Where to get your medicines      These medications were sent to [facility] - [place] - [address]  [address], [place] [address]     Phone:  [phone]     amLODIPine 5 MG tablet    atenolol 50 MG tablet          Primary Care Provider Office Phone # Fax #    Laure Doan [phone] [address] [address]        Equal Access to Services     [facility] Center[facility] AH: Hadii davon knowles hadasho Soisraelali, waaxda luqadaha, qaybta kaalmada adeegyada, lesli malik . So [facility] [place] [phone].    ATENCIÓN: Si habla español, tiene a etienne disposición servicios gratuitos de asistencia lingüística. Llame al [phone].    We comply with applicable federal civil rights laws and [place] laws. We do not discriminate on the basis of race, color, national origin, age, disability, sex, sexual orientation, or gender identity.            Thank you!     Thank you for choosing [facility]  for your care. Our goal is always to provide you with excellent care. Hearing back from our patients is one way we can continue to improve our services. Please take a few minutes to complete the written survey that you may receive in the mail after your visit with us. Thank you!             Your Updated Medication List - Protect others around you: Learn how to safely use, store and throw away your medicines at www.disposemymeds.org.          This list is accurate as of 3/13/18  5:07 PM.  Always use your most recent med list.                   Brand Name Dispense Instructions for  use Diagnosis    amLODIPine 5 MG tablet    NORVASC    135 tablet    TAKE ONE AND ONE-HALF TABLETS BY MOUTH ONCE DAILY    Essential hypertension with goal blood pressure less than 140/90       atenolol 50 MG tablet    TENORMIN    90 tablet    Take 1 tablet (50 mg) by mouth daily    Essential hypertension with goal blood pressure less than 140/90       omeprazole 20 MG CR capsule    priLOSEC    90 capsule    Take 1 capsule (20 mg) by mouth daily

## 2018-03-13 NOTE — PROGRESS NOTES
SUBJECTIVE:   Willard Bartlett is a 52 year old male who presents to clinic today for the following health issues:      Hypertension Follow-up      Outpatient blood pressures are being checked at home.  Results are normal.    Low Salt Diet: no added salt      Amount of exercise or physical activity: [employer]    Problems taking medications regularly: No    Medication side effects: none    Diet: regular (no restrictions)            Problem list and histories reviewed & adjusted, as indicated.  Additional history: as documented    Patient Active Problem List   Diagnosis     Hypertriglyceridemia     Neurocysticercosis     CARDIOVASCULAR SCREENING; LDL GOAL LESS THAN 130     Ex-smoker     DDD (degenerative disc disease), lumbar     Essential hypertension with goal blood pressure less than 140/90     Impaired glucose tolerance     Past Surgical History:   Procedure Laterality Date     COLONOSCOPY WITH CO2 INSUFFLATION N/A 9/7/2016    Procedure: COLONOSCOPY WITH CO2 INSUFFLATION;  Surgeon: Duane, William Charles, MD;  Location:  OR     NO HISTORY OF SURGERY         Social History   Substance Use Topics     Smoking status: Former Smoker     Packs/day: 0.50     Types: Cigarettes     Quit date: 3/12/2012     Smokeless tobacco: Never Used     Alcohol use No      Comment: prior use of 2-3 whiskey 4 days a week, quit 8/2012     Family History   Problem Relation Age of Onset     C.A.D. Father 68     MI, d. 68     Hypertension Father      Hypertension Mother      CEREBROVASCULAR DISEASE Mother      Hypertension Brother      CANCER Brother      Hypertension Brother          Current Outpatient Prescriptions   Medication Sig Dispense Refill     amLODIPine (NORVASC) 5 MG tablet TAKE ONE AND ONE-HALF TABLETS BY MOUTH ONCE DAILY 135 tablet 3     atenolol (TENORMIN) 50 MG tablet Take 1 tablet (50 mg) by mouth daily 90 tablet 3     [DISCONTINUED] amLODIPine (NORVASC) 5 MG tablet TAKE ONE AND ONE-HALF TABLETS BY MOUTH ONCE DAILY  "135 tablet 3     [DISCONTINUED] atenolol (TENORMIN) 50 MG tablet Take 1 tablet (50 mg) by mouth daily 90 tablet 1     omeprazole (PRILOSEC) 20 MG CR capsule Take 1 capsule (20 mg) by mouth daily (Patient not taking: Reported on 8/22/2017) 90 capsule 0     No Known Allergies  Recent Labs   Lab Test  08/22/17   1619  08/01/16   0825  06/24/16   0848  06/14/16   1317  09/15/15   1043  02/23/15   1406  09/25/14   0911  12/09/13   0709   A1C  6.0   --    --   6.3*   --    --    --    --    LDL   --    --   86   --   83   --   111  80   HDL   --    --   28*   --   30*   --   33*  23*   TRIG   --    --   134   --   103   --   97  68   ALT   --   40   --    --    --   34   --   41   CR  0.88   --    --   0.93  1.08  1.17  0.97  0.99   GFRESTIMATED  >90   --    --   86  73  66  82  81   GFRESTBLACK  >90   --    --   >90   GFR Calc    88  80  >90   GFR Calc    >90   POTASSIUM  4.3   --    --   4.6  4.5  3.8  4.4  4.3   TSH   --    --    --    --    --   1.56   --    --       BP Readings from Last 3 Encounters:   03/13/18 136/88   08/22/17 136/88   02/03/17 138/90    Wt Readings from Last 3 Encounters:   03/13/18 197 lb (89.4 kg)   08/22/17 194 lb (88 kg)   02/03/17 196 lb (88.9 kg)                  Labs reviewed in EPIC    Reviewed and updated as needed this visit by clinical staff  Tobacco  Allergies  Meds       Reviewed and updated as needed this visit by Provider  Tobacco  Allergies  Meds         ROS:  CONSTITUTIONAL: NEGATIVE for fever, chills, change in weight  ENT/MOUTH: NEGATIVE for ear, mouth and throat problems  RESP: NEGATIVE for significant cough or SOB  CV: NEGATIVE for chest pain, palpitations or peripheral edema  GI: NEGATIVE for nausea, abdominal pain, heartburn, or change in bowel habits    OBJECTIVE:     /88  Pulse 74  Temp 97  F (36.1  C)  Resp 14  Ht 5' 10\" (1.778 m)  Wt 197 lb (89.4 kg)  SpO2 98%  BMI 28.27 kg/m2  Body mass index is 28.27 " "kg/(m^2).  GENERAL: healthy, alert and no distress  NECK: no adenopathy, no asymmetry, masses, or scars and thyroid normal to palpation  RESP: lungs clear to auscultation - no rales, rhonchi or wheezes  CV: regular rate and rhythm, normal S1 S2, no S3 or S4, no murmur, click or rub, no peripheral edema and peripheral pulses strong  ABDOMEN: soft, nontender, no hepatosplenomegaly, no masses and bowel sounds normal  MS: no gross musculoskeletal defects noted, no edema    Diagnostic Test Results:  none     ASSESSMENT/PLAN:         BMI:   Estimated body mass index is 28.27 kg/(m^2) as calculated from the following:    Height as of this encounter: 5' 10\" (1.778 m).    Weight as of this encounter: 197 lb (89.4 kg).   Weight management plan: low yany diet      1. Essential hypertension with goal blood pressure less than 140/90  Stable   - amLODIPine (NORVASC) 5 MG tablet; TAKE ONE AND ONE-HALF TABLETS BY MOUTH ONCE DAILY  Dispense: 135 tablet; Refill: 3  - atenolol (TENORMIN) 50 MG tablet; Take 1 tablet (50 mg) by mouth daily  Dispense: 90 tablet; Refill: 3    2. Need for prophylactic vaccination and inoculation against influenza  Advised   - FLU VAC, SPLIT VIRUS IM > 3 YO (QUADRIVALENT) [39125]  - Vaccine Administration, Initial [42759]    3. Ex-smoker    Follow up 6 months  Laure Doan MD  [facility]    Injectable Influenza Immunization Documentation    1.  Is the person to be vaccinated sick today?   No    2. Does the person to be vaccinated have an allergy to a component   of the vaccine?   No  Egg Allergy Algorithm Link    3. Has the person to be vaccinated ever had a serious reaction   to influenza vaccine in the past?   No    4. Has the person to be vaccinated ever had Guillain-Barré syndrome?   No    Form completed by Kilo Meyers. MA           "

## 2018-08-07 ENCOUNTER — OFFICE VISIT (OUTPATIENT)
Dept: FAMILY MEDICINE | Facility: CLINIC | Age: 53
End: 2018-08-07
Payer: COMMERCIAL

## 2018-08-07 VITALS
OXYGEN SATURATION: 97 % | DIASTOLIC BLOOD PRESSURE: 84 MMHG | TEMPERATURE: 97.5 F | SYSTOLIC BLOOD PRESSURE: 128 MMHG | HEART RATE: 64 BPM | BODY MASS INDEX: 29.2 KG/M2 | RESPIRATION RATE: 13 BRPM | WEIGHT: 204 LBS | HEIGHT: 70 IN

## 2018-08-07 DIAGNOSIS — H93.8X2 PLUGGED FEELING IN EAR, LEFT: Primary | ICD-10-CM

## 2018-08-07 PROCEDURE — 99212 OFFICE O/P EST SF 10 MIN: CPT | Performed by: FAMILY MEDICINE

## 2018-08-07 NOTE — PROGRESS NOTES
SUBJECTIVE:   Willard Bartlett is a 53 year old male who presents to clinic today for the following health issues:        Chief Complaint   Patient presents with     Ear Problem     hit with volleyball in left ear ( day) feel like it plugged or less hearing     No drainage        Problem list and histories reviewed & adjusted, as indicated.  Additional history: as documented    Patient Active Problem List   Diagnosis     Hypertriglyceridemia     Neurocysticercosis     CARDIOVASCULAR SCREENING; LDL GOAL LESS THAN 130     Ex-smoker     DDD (degenerative disc disease), lumbar     Essential hypertension with goal blood pressure less than 140/90     Impaired glucose tolerance     Past Surgical History:   Procedure Laterality Date     COLONOSCOPY WITH CO2 INSUFFLATION N/A 9/7/2016    Procedure: COLONOSCOPY WITH CO2 INSUFFLATION;  Surgeon: Duane, William Charles, MD;  Location: [facility] OR     NO HISTORY OF SURGERY         Social History   Substance Use Topics     Smoking status: Former Smoker     Packs/day: 0.50     Types: Cigarettes     Quit date: 3/12/2012     Smokeless tobacco: Never Used     Alcohol use No      Comment: prior use of 2-3 whiskey 4 days a week, quit 8/2012     Family History   Problem Relation Age of Onset     C.A.D. Father 68     MI, dZoie 68     Hypertension Father      Hypertension Mother      Cerebrovascular Disease Mother      Hypertension Brother      Cancer Brother      Hypertension Brother          Current Outpatient Prescriptions   Medication Sig Dispense Refill     amLODIPine (NORVASC) 5 MG tablet TAKE ONE AND ONE-HALF TABLETS BY MOUTH ONCE DAILY 135 tablet 3     atenolol (TENORMIN) 50 MG tablet Take 1 tablet (50 mg) by mouth daily 90 tablet 3     omeprazole (PRILOSEC) 20 MG CR capsule Take 1 capsule (20 mg) by mouth daily 90 capsule 0     No Known Allergies  Recent Labs   Lab Test  08/22/17   1619  08/01/16   0825  06/24/16   0848  06/14/16   1317  09/15/15   1043  02/23/15   1406  09/25/14   0911   "12/09/13   0709   A1C  6.0   --    --   6.3*   --    --    --    --    LDL   --    --   86   --   83   --   111  80   HDL   --    --   28*   --   30*   --   33*  23*   TRIG   --    --   134   --   103   --   97  68   ALT   --   40   --    --    --   34   --   41   CR  0.88   --    --   0.93  1.08  1.17  0.97  0.99   GFRESTIMATED  >90   --    --   86  73  66  82  81   GFRESTBLACK  >90   --    --   >90   GFR Calc    88  80  >90   GFR Calc    >90   POTASSIUM  4.3   --    --   4.6  4.5  3.8  4.4  4.3   TSH   --    --    --    --    --   1.56   --    --       BP Readings from Last 3 Encounters:   08/07/18 128/84   03/13/18 136/88   08/22/17 136/88    Wt Readings from Last 3 Encounters:   08/07/18 204 lb (92.5 kg)   03/13/18 197 lb (89.4 kg)   08/22/17 194 lb (88 kg)                  Labs reviewed in EPIC    Reviewed and updated as needed this visit by clinical staff       Reviewed and updated as needed this visit by Provider         ROS:  CONSTITUTIONAL: NEGATIVE for fever, chills, change in weight  INTEGUMENTARY/SKIN: NEGATIVE for worrisome rashes, moles or lesions  ENT/MOUTH: as above  RESP: NEGATIVE for significant cough or SOB    OBJECTIVE:     /84  Pulse 64  Temp 97.5  F (36.4  C)  Resp 13  Ht 5' 10\" (1.778 m)  Wt 204 lb (92.5 kg)  SpO2 97%  BMI 29.27 kg/m2  Body mass index is 29.27 kg/(m^2).  GENERAL: healthy, alert and no distress  HENT: ear canals and TM's normal, nose and mouth without ulcers or lesions    NECK: no adenopathy, no asymmetry, masses, or scars and thyroid normal to palpation  RESP: lungs clear to auscultation - no rales, rhonchi or wheezes    Diagnostic Test Results:  none     ASSESSMENT/PLAN:     Left ear is Plugged  Comment: advised advil/ice  Plan: follow up if not better        Laure Doan MD  [facility]    "

## 2018-08-07 NOTE — MR AVS SNAPSHOT
After Visit Summary   8/7/2018    Willard Bartlett    MRN: [id number]           Patient Information     Date Of Birth          1965        Visit Information        Provider Department      8/7/2018 12:30 PM Laure Doan MD [facility]        Today's Diagnoses     Screening for HIV (human immunodeficiency virus)    -  1      Care Instructions      [facility]    If you have any questions regarding to your visit please contact your care team:       Team Red:   Clinic Hours Telephone Number   Dr. Khadijah Shepherd, NP   7am-7pm  Monday - Thursday   7am-5pm  Fridays  [phone]  (Appointment scheduling available 24/7)    Questions about your recent visit?   Team Line  [phone]   Urgent Care - [place] and [facility] - 11am-9pm Monday-Friday Saturday-Sunday- 9am-5pm   [place] - 5pm-9pm Monday-Friday Saturday-Sunday- 9am-5pm  [phone] - [place]  [phone] - [place]       What options do I have for a visit other than an office visit? We offer electronic visits (e-visits) and telephone visits, when medically appropriate.  Please check with your medical insurance to see if these types of visits are covered, as you will be responsible for any charges that are not paid by your insurance.      You can use [employer] (secure electronic communication) to access to your chart, send your primary care provider a message, or make an appointment. Ask a team member how to get started.     For a price quote for your services, please call our Consumer Price Line at [phone] or our Imaging Cost estimation line at [phone] (for imaging tests).    Earache, No Infection (Adult)  Earaches can happen without an infection. This occurs when air and fluid build up behind the eardrum causing a feeling of fullness and discomfort and reduced hearing. This is called otitis media with effusion (OME) or serous otitis  media. It means there is fluid in the middle ear. It is not the same as acute otitis media, which is typically from infection.  OME can happen when you have a cold if congestion blocks the passage that drains the middle ear. This passage is called the eustachian tube. OME may also occur with nasal allergies or after a bacterial middle ear infection.    The pain or discomfort may come and go. You may hear clicking or popping sounds when you chew or swallow. You may feel that your balance is off. Or you may hear ringing in the ear.  It often takes from several weeks up to 3 months for the fluid to clear on its own. Oral pain relievers and ear drops help if there is pain. Decongestants and antihistamines sometimes help. Antibiotics don't help since there is no infection. Your doctor may prescribe a nasal spray to help reduce swelling in the nose and eustachian tube. This can allow the ear to drain.  If your OME doesn't improve after 3 months, surgery may be used to drain the fluid and insert a small tube in the eardrum to allow continued drainage.  Because the middle ear fluid can become infected, it is important to watch for signs of an ear infection which may develop later. These signs include increased ear pain, fever, or drainage from the ear.  Home care  The following guidelines will help you care for yourself at home:    You may use over-the-counter medicine as directed to control pain, unless another medicine was prescribed. If you have chronic liver or kidney disease or ever had a stomach ulcer or GI bleeding, talk with your doctor before using these medicines. Aspirin should never be used in anyone under 18 years of age who is ill with a fever. It may cause severe liver damage.    You may use over-the-counter decongestants such as phenylephrine or pseudoephedrine. But they are not always helpful. Don't use nasal spray decongestants more than 3 days. Longer use can make congestion worse. Prescription nasal  sprays from your doctor don't typically have those restrictions.    Antihistamines may help if you are also having allergy symptoms.    You may use medicines such as guaifenesin to thin mucus and promote drainage.  Follow-up care  Follow up with your healthcare provider or as advised if you are not feeling better after 3 days.  When to seek medical advice  Call your healthcare provider right away if any of the following occur:    Your ear pain gets worse or does not start to improve     Fever of 100.4 F (38 C) or higher, or as directed by your healthcare provider    Fluid or blood draining from the ear    Headache or sinus pain    Stiff neck    Unusual drowsiness or confusion  Date Last Reviewed: 10/1/2016    0269-0774 [employer]. [address] [place] [address], [place] [address]. All rights reserved. This information is not intended as a substitute for professional medical care. Always follow your healthcare professional's instructions.                Follow-ups after your visit        Who to contact     If you have questions or need follow up information about today's clinic visit or your schedule please contact [facility] directly at [phone].  Normal or non-critical lab and imaging results will be communicated to you by [employer], letter or phone within 4 business days after the clinic has received the results. If you do not hear from us within 7 days, please contact the clinic through [employer] or phone. If you have a critical or abnormal lab result, we will notify you by phone as soon as possible.  Submit refill requests through [employer] or call your pharmacy and they will forward the refill request to us. Please allow 3 business days for your refill to be completed.          Additional Information About Your Visit        [employer] Information     [employer] gives you secure access to your electronic health record. If you see a primary care provider, you can also send messages to your care team  "and make appointments. If you have questions, please call your primary care clinic.  If you do not have a primary care provider, please call [phone] and they will assist you.        Care EveryWhere ID     This is your Care EveryWhere ID. This could be used by other organizations to access your [place] medical records  [id number]        Your Vitals Were     Pulse Temperature Respirations Height Pulse Oximetry BMI (Body Mass Index)    64 97.5  F (36.4  C) 13 5' 10\" (1.778 m) 97% 29.27 kg/m2       Blood Pressure from Last 3 Encounters:   08/07/18 128/84   03/13/18 136/88   08/22/17 136/88    Weight from Last 3 Encounters:   08/07/18 204 lb (92.5 kg)   03/13/18 197 lb (89.4 kg)   08/22/17 194 lb (88 kg)              Today, you had the following     No orders found for display       Primary Care Provider Office Phone # Fax #    Laure Doan [phone] [address] [facility] [address]        Equal Access to Services     [facility]: Hadii davon knowles hadekaterina Solee, waaxda luclay, qaybta kaalpretty díaz, lesli malik . [facility] [phone].    ATENCIÓN: Si habla español, tiene a etienne disposición servicios gratuitos de asistencia lingüística. [facility] [phone].    We comply with applicable federal civil rights laws and [place] laws. We do not discriminate on the basis of race, color, national origin, age, disability, sex, sexual orientation, or gender identity.            Thank you!     Thank you for choosing [facility]  for your care. Our goal is always to provide you with excellent care. Hearing back from our patients is one way we can continue to improve our services. Please take a few minutes to complete the written survey that you may receive in the mail after your visit with us. Thank you!             Your Updated Medication List - Protect others around you: Learn how to safely use, store and throw away your medicines at " www.disposemymeds.org.          This list is accurate as of 8/7/18 12:54 PM.  Always use your most recent med list.                   Brand Name Dispense Instructions for use Diagnosis    amLODIPine 5 MG tablet    NORVASC    135 tablet    TAKE ONE AND ONE-HALF TABLETS BY MOUTH ONCE DAILY    Essential hypertension with goal blood pressure less than 140/90       atenolol 50 MG tablet    TENORMIN    90 tablet    Take 1 tablet (50 mg) by mouth daily    Essential hypertension with goal blood pressure less than 140/90       omeprazole 20 MG CR capsule    priLOSEC    90 capsule    Take 1 capsule (20 mg) by mouth daily

## 2018-08-07 NOTE — PATIENT INSTRUCTIONS
[facility]    If you have any questions regarding to your visit please contact your care team:       Team Red:   Clinic Hours Telephone Number   Dr. Khadijah Shepherd, NP   7am-7pm  Monday - Thursday   7am-5pm  Fridays  [phone]  (Appointment scheduling available 24/7)    Questions about your recent visit?   Team Line  [phone]   Urgent Care - [place] and [facility] - 11am-9pm Monday-Friday Saturday-Sunday- 9am-5pm   [place] - 5pm-9pm Monday-Friday Saturday-Sunday- 9am-5pm  [phone] - [place]  [phone] - [place]       What options do I have for a visit other than an office visit? We offer electronic visits (e-visits) and telephone visits, when medically appropriate.  Please check with your medical insurance to see if these types of visits are covered, as you will be responsible for any charges that are not paid by your insurance.      You can use [employer] (secure electronic communication) to access to your chart, send your primary care provider a message, or make an appointment. Ask a team member how to get started.     For a price quote for your services, please call our Consumer Price Line at [phone] or our Imaging Cost estimation line at [phone] (for imaging tests).    Earache, No Infection (Adult)  Earaches can happen without an infection. This occurs when air and fluid build up behind the eardrum causing a feeling of fullness and discomfort and reduced hearing. This is called otitis media with effusion (OME) or serous otitis media. It means there is fluid in the middle ear. It is not the same as acute otitis media, which is typically from infection.  OME can happen when you have a cold if congestion blocks the passage that drains the middle ear. This passage is called the eustachian tube. OME may also occur with nasal allergies or after a bacterial middle ear infection.    The pain or discomfort may  come and go. You may hear clicking or popping sounds when you chew or swallow. You may feel that your balance is off. Or you may hear ringing in the ear.  It often takes from several weeks up to 3 months for the fluid to clear on its own. Oral pain relievers and ear drops help if there is pain. Decongestants and antihistamines sometimes help. Antibiotics don't help since there is no infection. Your doctor may prescribe a nasal spray to help reduce swelling in the nose and eustachian tube. This can allow the ear to drain.  If your OME doesn't improve after 3 months, surgery may be used to drain the fluid and insert a small tube in the eardrum to allow continued drainage.  Because the middle ear fluid can become infected, it is important to watch for signs of an ear infection which may develop later. These signs include increased ear pain, fever, or drainage from the ear.  Home care  The following guidelines will help you care for yourself at home:    You may use over-the-counter medicine as directed to control pain, unless another medicine was prescribed. If you have chronic liver or kidney disease or ever had a stomach ulcer or GI bleeding, talk with your doctor before using these medicines. Aspirin should never be used in anyone under 18 years of age who is ill with a fever. It may cause severe liver damage.    You may use over-the-counter decongestants such as phenylephrine or pseudoephedrine. But they are not always helpful. Don't use nasal spray decongestants more than 3 days. Longer use can make congestion worse. Prescription nasal sprays from your doctor don't typically have those restrictions.    Antihistamines may help if you are also having allergy symptoms.    You may use medicines such as guaifenesin to thin mucus and promote drainage.  Follow-up care  Follow up with your healthcare provider or as advised if you are not feeling better after 3 days.  When to seek medical advice  Call your healthcare  provider right away if any of the following occur:    Your ear pain gets worse or does not start to improve     Fever of 100.4 F (38 C) or higher, or as directed by your healthcare provider    Fluid or blood draining from the ear    Headache or sinus pain    Stiff neck    Unusual drowsiness or confusion  Date Last Reviewed: 10/1/2016    6581-2818 [employer]. [address] [place] [address]. All rights reserved. This information is not intended as a substitute for professional medical care. Always follow your healthcare professional's instructions.

## 2019-01-05 ENCOUNTER — ANCILLARY PROCEDURE (OUTPATIENT)
Dept: GENERAL RADIOLOGY | Facility: CLINIC | Age: 54
End: 2019-01-05
Payer: COMMERCIAL

## 2019-01-05 ENCOUNTER — OFFICE VISIT (OUTPATIENT)
Dept: URGENT CARE | Facility: URGENT CARE | Age: 54
End: 2019-01-05
Payer: COMMERCIAL

## 2019-01-05 VITALS
SYSTOLIC BLOOD PRESSURE: 146 MMHG | TEMPERATURE: 98.1 F | BODY MASS INDEX: 28.55 KG/M2 | DIASTOLIC BLOOD PRESSURE: 95 MMHG | OXYGEN SATURATION: 99 % | WEIGHT: 199 LBS | HEART RATE: 71 BPM

## 2019-01-05 DIAGNOSIS — S79.912A HIP INJURY, LEFT, INITIAL ENCOUNTER: ICD-10-CM

## 2019-01-05 DIAGNOSIS — S79.912A HIP INJURY, LEFT, INITIAL ENCOUNTER: Primary | ICD-10-CM

## 2019-01-05 PROCEDURE — 99213 OFFICE O/P EST LOW 20 MIN: CPT | Performed by: PHYSICIAN ASSISTANT

## 2019-01-05 PROCEDURE — 73502 X-RAY EXAM HIP UNI 2-3 VIEWS: CPT

## 2019-01-05 RX ORDER — METHOCARBAMOL 750 MG/1
750 TABLET, FILM COATED ORAL
COMMUNITY
Start: 2010-11-30 | End: 2019-01-10

## 2019-01-05 RX ORDER — OXYCODONE AND ACETAMINOPHEN 5; 325 MG/1; MG/1
1-2 TABLET ORAL
COMMUNITY
Start: 2010-11-30 | End: 2019-01-10

## 2019-01-05 RX ORDER — IBUPROFEN 800 MG/1
TABLET, FILM COATED ORAL
COMMUNITY
Start: 2007-08-23 | End: 2019-01-10

## 2019-01-05 RX ORDER — ASPIRIN 81 MG/1
81 TABLET, CHEWABLE ORAL
COMMUNITY
End: 2019-01-10

## 2019-01-05 RX ORDER — IBUPROFEN 200 MG
200 TABLET ORAL EVERY 4 HOURS PRN
COMMUNITY
End: 2019-01-10

## 2019-01-05 RX ORDER — MECLIZINE HYDROCHLORIDE 25 MG/1
25 TABLET ORAL
COMMUNITY
Start: 2017-11-27 | End: 2019-01-10

## 2019-01-05 RX ORDER — CYCLOBENZAPRINE HCL 10 MG
10 TABLET ORAL 2 TIMES DAILY PRN
Qty: 20 TABLET | Refills: 0 | Status: SHIPPED | OUTPATIENT
Start: 2019-01-05 | End: 2019-06-13

## 2019-01-05 RX ORDER — NAPROXEN 500 MG/1
500 TABLET ORAL 2 TIMES DAILY WITH MEALS
Qty: 20 TABLET | Refills: 1 | Status: SHIPPED | OUTPATIENT
Start: 2019-01-05 | End: 2019-06-13

## 2019-01-05 NOTE — PROGRESS NOTES
S: 53-year-old male presents with his wife for evaluation of left hip injury January 1, 2019.  He was carrying a heavy object down the stairs and his left foot slipped but he did not completely fall.  Since then he has had left hip pain.  He is able to sleep on that hip.  He denies any low back pain.  No bowel or bladder trouble.  No fever.  No lower extremity numbness or tingling.  The pain will radiate into the anterior thigh. Advil no help    Past Medical History:   Diagnosis Date     Alcohol abuse      Cysticercosis     Neuro     DDD (degenerative disc disease), lumbar      ED (erectile dysfunction)      Elevated LFT's     resolved     Gastritis      HTN (hypertension)      Hypertriglyceridemia 4/3/08    lipid clinic     Impaired glucose tolerance      Lumbar spinal stenosis      Seizure disorder (H)     related to neurocysticercosis, resolved, prior Dilantin use     History   Smoking Status     Former Smoker     Packs/day: 0.50     Types: Cigarettes     Quit date: 3/12/2012   Smokeless Tobacco     Never Used       ROS:  GEN no fevers  SKIN no erythema  Musculoskeletal:  See HPI.      OBJECTIVE:  Blood pressure (!) 146/95, pulse 71, temperature 98.1  F (36.7  C), temperature source Oral, weight 90.3 kg (199 lb), SpO2 99 %.  Patient is alert and NAD.  EYES: conjunctiva clear  Hip  Exam (left):  Inspection:no swelling seen  Palpation: Tender over the greater trochanteric area  Full range of motion but pain with extreme internal and external rotation.  Cap refill intact.    Good doralis pedis.  Neurovascularly Intact Distally.   Lumbar spine is nontender to palpation.      XR- I see no obvious fx/fb    Study Result     PELVIS AND LEFT HIP TWO VIEWS 1/5/2019 11:39 AM     HISTORY: Left hip injury.     COMPARISON: None.     FINDINGS: Apparent ossification along the lateral margins of the pubic  bones bilaterally between the superior and inferior pubic rami are  likely chronic in nature. No definite acute fracture or  osseous lesion  is seen. The joint spaces are well preserved. No soft tissue pathology  is seen.                                                                       IMPRESSION: No definite acute abnormality is seen.       ASSESSMENT:    ICD-10-CM    1. Hip injury, left, initial encounter S79.912A XR Pelvis and Hip Left 2 Views         PLAN: ? Bursitis. Naprosyn, flexeril. See Ortho.    Jia Monreal PA-C

## 2019-01-07 NOTE — PROGRESS NOTES
SUBJECTIVE:   Willard Bartlett is a 53 year old male who presents to clinic today for the following health issues:      Hypertension Follow-up    Outpatient blood pressures are not being checked.    Low Salt Diet: not monitoring salt      Amount of exercise or physical activity: 2-3 days/week for an average of greater than 60 minutes    Problems taking medications regularly: No    Medication side effects: none    Diet: regular (no restrictions)      Medication Followup of All Medications     Taking Medication as prescribed: yes    Side Effects:  None    Medication Helping Symptoms:  yes         ED/UC Followup:    Facility:  [facility]   Date of visit: 2019  Reason for visit: Left Hip Pain -was carrying a heavy pot   Current Status: Still have pain          Problem list and histories reviewed & adjusted, as indicated.  Additional history: as documented    Patient Active Problem List   Diagnosis     Hypertriglyceridemia     Neurocysticercosis     CARDIOVASCULAR SCREENING; LDL GOAL LESS THAN 130     Ex-smoker     DDD (degenerative disc disease), lumbar     Essential hypertension with goal blood pressure less than 140/90     Impaired glucose tolerance     Past Surgical History:   Procedure Laterality Date     COLONOSCOPY WITH CO2 INSUFFLATION N/A 2016    Procedure: COLONOSCOPY WITH CO2 INSUFFLATION;  Surgeon: Duane, William Charles, MD;  Location: [facility] OR     NO HISTORY OF SURGERY         Social History     Tobacco Use     Smoking status: Former Smoker     Packs/day: 0.50     Types: Cigarettes     Last attempt to quit: 3/12/2012     Years since quittin.8     Smokeless tobacco: Never Used   Substance Use Topics     Alcohol use: No     Alcohol/week: 0.0 oz     Comment: prior use of 2-3 whiskey 4 days a week, quit 2012     Family History   Problem Relation Age of Onset     C.A.D. Father 68        MI, dZoie 68     Hypertension Father      Hypertension Mother      Cerebrovascular Disease Mother       Hypertension Brother      Cancer Brother      Hypertension Brother          Current Outpatient Medications   Medication Sig Dispense Refill     amLODIPine (NORVASC) 10 MG tablet Take 1 tablet (10 mg) by mouth daily 30 tablet 0     amLODIPine (NORVASC) 5 MG tablet Stop this medicine  0     atenolol (TENORMIN) 50 MG tablet Take 1 tablet (50 mg) by mouth daily 90 tablet 3     cyclobenzaprine (FLEXERIL) 10 MG tablet Take 1 tablet (10 mg) by mouth 2 times daily as needed for muscle spasms 20 tablet 0     ibuprofen (ADVIL/MOTRIN) 200 MG tablet Take 200 mg by mouth every 4 hours as needed for mild pain       ibuprofen (ADVIL/MOTRIN) 800 MG tablet        naproxen (NAPROSYN) 500 MG tablet Take 1 tablet (500 mg) by mouth 2 times daily (with meals) 20 tablet 1     aspirin (ASA) 81 MG chewable tablet Take 81 mg by mouth       meclizine (ANTIVERT) 25 MG tablet Take 25 mg by mouth       methocarbamol (ROBAXIN) 750 MG tablet Take 750 mg by mouth       omeprazole (PRILOSEC) 20 MG CR capsule Take 1 capsule (20 mg) by mouth daily (Patient not taking: Reported on 1/5/2019) 90 capsule 0     oxyCODONE-acetaminophen (PERCOCET) 5-325 MG tablet Take 1-2 tablets by mouth       No Known Allergies  Recent Labs   Lab Test 08/22/17  1619 08/01/16  0825 06/24/16  0848 06/14/16  1317 09/15/15  1043 02/23/15  1406 09/25/14  0911 12/09/13  0709   A1C 6.0  --   --  6.3*  --   --   --   --    LDL  --   --  86  --  83  --  111 80   HDL  --   --  28*  --  30*  --  33* 23*   TRIG  --   --  134  --  103  --  97 68   ALT  --  40  --   --   --  34  --  41   CR 0.88  --   --  0.93 1.08 1.17 0.97 0.99   GFRESTIMATED >90  --   --  86 73 66 82 81   GFRESTBLACK >90  --   --  >90   GFR Calc   88 80 >90   GFR Calc   >90   POTASSIUM 4.3  --   --  4.6 4.5 3.8 4.4 4.3   TSH  --   --   --   --   --  1.56  --   --       BP Readings from Last 3 Encounters:   01/08/19 (!) 152/98   01/05/19 (!) 146/95   08/07/18 128/84    Wt Readings from  "Last 3 Encounters:   01/08/19 90.3 kg (199 lb)   01/05/19 90.3 kg (199 lb)   08/07/18 92.5 kg (204 lb)                  Labs reviewed in EPIC    Reviewed and updated as needed this visit by clinical staff  Tobacco  Allergies  Meds  Med Hx  Surg Hx  Fam Hx  Soc Hx      Reviewed and updated as needed this visit by Provider  Meds         ROS:  CONSTITUTIONAL: NEGATIVE for fever, chills, change in weight  RESP: NEGATIVE for significant cough or SOB  CV: NEGATIVE for chest pain, palpitations or peripheral edema  GI: NEGATIVE for nausea, abdominal pain, heartburn, or change in bowel habits  MUSCULOSKELETAL: as above  PSYCHIATRIC: NEGATIVE for changes in mood or affect  ROS otherwise negative    OBJECTIVE:     BP (!) 152/98   Pulse 59   Temp 97.2  F (36.2  C) (Oral)   Resp 16   Ht 1.79 m (5' 10.47\")   Wt 90.3 kg (199 lb)   SpO2 99%   BMI 28.17 kg/m    Body mass index is 28.17 kg/m .  GENERAL: healthy, alert and no distress  NECK: no adenopathy, no asymmetry, masses, or scars and thyroid normal to palpation  RESP: lungs clear to auscultation - no rales, rhonchi or wheezes  CV: regular rate and rhythm, normal S1 S2, no S3 or S4, no murmur, click or rub, no peripheral edema and peripheral pulses strong  ABDOMEN: soft, nontender, no hepatosplenomegaly, no masses and bowel sounds normal  MS: left Hip no tenderness   ROM is good  Mild pain Left Lower back on flexion and Extension  Straight leg raise is negative  SKIN: no suspicious lesions or rashes  PSYCH: mentation appears normal, affect normal/bright    Diagnostic Test Results:  none     ASSESSMENT/PLAN:         1. Essential hypertension with goal blood pressure less than 140/90  High  Advised increase Norvasc to 10 mg daily  Follow up BP check 2 weeks  - BASIC METABOLIC PANEL  - Lipid panel reflex to direct LDL Fasting  - atenolol (TENORMIN) 50 MG tablet; Take 1 tablet (50 mg) by mouth daily  Dispense: 90 tablet; Refill: 3  - amLODIPine (NORVASC) 5 MG tablet; " Stop this medicine; Refill: 0  - amLODIPine (NORVASC) 10 MG tablet; Take 1 tablet (10 mg) by mouth daily  Dispense: 30 tablet; Refill: 0    2. Hip pain, left  Consider PT  Pt is getting better  Pt wants to see Ortho  Okay to make appointment     3. Acute left-sided low back pain without sciatica  Consider PT  Continue same medicines'  NSAID    4. Need for prophylactic vaccination and inoculation against influenza  Advised   - FLU VACCINE, (RIV4) RECOMBINANT HA  , IM (FluBlok, egg free) [74907]- >18 YRS (FMG recommended  50-64 YRS)  - Vaccine Administration, Initial [54735]  Laure Doan MD  [facility]

## 2019-01-08 ENCOUNTER — OFFICE VISIT (OUTPATIENT)
Dept: FAMILY MEDICINE | Facility: CLINIC | Age: 54
End: 2019-01-08
Payer: COMMERCIAL

## 2019-01-08 VITALS
HEART RATE: 59 BPM | RESPIRATION RATE: 16 BRPM | SYSTOLIC BLOOD PRESSURE: 152 MMHG | WEIGHT: 199 LBS | OXYGEN SATURATION: 99 % | HEIGHT: 70 IN | DIASTOLIC BLOOD PRESSURE: 98 MMHG | TEMPERATURE: 97.2 F | BODY MASS INDEX: 28.49 KG/M2

## 2019-01-08 DIAGNOSIS — M25.552 HIP PAIN, LEFT: ICD-10-CM

## 2019-01-08 DIAGNOSIS — Z23 NEED FOR PROPHYLACTIC VACCINATION AND INOCULATION AGAINST INFLUENZA: ICD-10-CM

## 2019-01-08 DIAGNOSIS — M54.50 ACUTE LEFT-SIDED LOW BACK PAIN WITHOUT SCIATICA: ICD-10-CM

## 2019-01-08 DIAGNOSIS — I10 ESSENTIAL HYPERTENSION WITH GOAL BLOOD PRESSURE LESS THAN 140/90: ICD-10-CM

## 2019-01-08 LAB
ANION GAP SERPL CALCULATED.3IONS-SCNC: 5 MMOL/L (ref 3–14)
BUN SERPL-MCNC: 11 MG/DL (ref 7–30)
CALCIUM SERPL-MCNC: 9.4 MG/DL (ref 8.5–10.1)
CHLORIDE SERPL-SCNC: 103 MMOL/L (ref 94–109)
CHOLEST SERPL-MCNC: 211 MG/DL
CO2 SERPL-SCNC: 29 MMOL/L (ref 20–32)
CREAT SERPL-MCNC: 0.94 MG/DL (ref 0.66–1.25)
GFR SERPL CREATININE-BSD FRML MDRD: >90 ML/MIN/{1.73_M2}
GLUCOSE SERPL-MCNC: 110 MG/DL (ref 70–99)
HDLC SERPL-MCNC: 33 MG/DL
LDLC SERPL CALC-MCNC: 150 MG/DL
NONHDLC SERPL-MCNC: 178 MG/DL
POTASSIUM SERPL-SCNC: 4.3 MMOL/L (ref 3.4–5.3)
SODIUM SERPL-SCNC: 137 MMOL/L (ref 133–144)
TRIGL SERPL-MCNC: 138 MG/DL

## 2019-01-08 PROCEDURE — 90682 RIV4 VACC RECOMBINANT DNA IM: CPT | Performed by: FAMILY MEDICINE

## 2019-01-08 PROCEDURE — 36415 COLL VENOUS BLD VENIPUNCTURE: CPT | Performed by: FAMILY MEDICINE

## 2019-01-08 PROCEDURE — 80061 LIPID PANEL: CPT | Performed by: FAMILY MEDICINE

## 2019-01-08 PROCEDURE — 99214 OFFICE O/P EST MOD 30 MIN: CPT | Mod: 25 | Performed by: FAMILY MEDICINE

## 2019-01-08 PROCEDURE — 90471 IMMUNIZATION ADMIN: CPT | Performed by: FAMILY MEDICINE

## 2019-01-08 PROCEDURE — 80048 BASIC METABOLIC PNL TOTAL CA: CPT | Performed by: FAMILY MEDICINE

## 2019-01-08 RX ORDER — AMLODIPINE BESYLATE 5 MG/1
TABLET ORAL
Refills: 0 | Status: SHIPPED
Start: 2019-01-08 | End: 2019-06-13

## 2019-01-08 RX ORDER — AMLODIPINE BESYLATE 10 MG/1
10 TABLET ORAL DAILY
Qty: 30 TABLET | Refills: 0 | Status: SHIPPED | OUTPATIENT
Start: 2019-01-08 | End: 2019-04-05

## 2019-01-08 RX ORDER — ATENOLOL 50 MG/1
50 TABLET ORAL DAILY
Qty: 90 TABLET | Refills: 3 | Status: SHIPPED | OUTPATIENT
Start: 2019-01-08 | End: 2019-06-13

## 2019-01-08 ASSESSMENT — MIFFLIN-ST. JEOR: SCORE: 1766.4

## 2019-01-08 NOTE — PROGRESS NOTES

## 2019-03-01 ENCOUNTER — TELEPHONE (OUTPATIENT)
Dept: FAMILY MEDICINE | Facility: CLINIC | Age: 54
End: 2019-03-01

## 2019-03-01 NOTE — TELEPHONE ENCOUNTER
Panel Management Review      Patient has the following on his problem list:     Hypertension   Last three blood pressure readings:  BP Readings from Last 3 Encounters:   01/08/19 (!) 152/98   01/05/19 (!) 146/95   08/07/18 128/84     Blood pressure: MONITOR    HTN Guidelines:  Age 18-59 BP range:  Less than 140/90  Age 60-85 with Diabetes:  Less than 140/90  Age 60-85 without Diabetes:  less than 150/90      Composite cancer screening  Chart review shows that this patient is due/due soon for the following None  Summary:    Patient is due/failing the following:   BP CHECK    Action needed:   Patient needs office visit for bp check.    Type of outreach:    Sent [employer] message.    Questions for provider review:    None                                                                                                                                    LS     Chart routed to none .

## 2019-05-14 DIAGNOSIS — I10 ESSENTIAL HYPERTENSION WITH GOAL BLOOD PRESSURE LESS THAN 140/90: ICD-10-CM

## 2019-05-15 NOTE — TELEPHONE ENCOUNTER
"Routing refill request to provider for review/approval because:  Labs out of range:  BP    Requested Prescriptions   Pending Prescriptions Disp Refills     amLODIPine (NORVASC) 10 MG tablet [Pharmacy Med Name: AMLODIPINE BESYLATE 10MG TABS] 30 tablet 0     Sig: TAKE ONE TABLET BY MOUTH ONCE DAILY       Calcium Channel Blockers Protocol  Failed - 5/15/2019 10:11 AM        Failed - Blood pressure under 140/90 in past 12 months     BP Readings from Last 3 Encounters:   01/08/19 (!) 152/98   01/05/19 (!) 146/95   08/07/18 128/84                 Passed - Recent (12 mo) or future (30 days) visit within the authorizing provider's specialty     Patient had office visit in the last 12 months or has a visit in the next 30 days with authorizing provider or within the authorizing provider's specialty.  See \"Patient Info\" tab in inbasket, or \"Choose Columns\" in Meds & Orders section of the refill encounter.              Passed - Medication is active on med list        Passed - Patient is age 18 or older        Passed - Normal serum creatinine on file in past 12 months     Recent Labs   Lab Test 01/08/19  1258   CR 0.94             Sallie Weston RN  "

## 2019-05-17 RX ORDER — AMLODIPINE BESYLATE 10 MG/1
TABLET ORAL
Qty: 30 TABLET | Refills: 0 | Status: SHIPPED | OUTPATIENT
Start: 2019-05-17 | End: 2019-06-06

## 2019-05-17 NOTE — TELEPHONE ENCOUNTER
Called patient and left VM to call clinic to schedule med check appointment. Please help schedule appointment if patient returns call.  Ruthann FERRERA CMA ([facility])

## 2019-05-18 ENCOUNTER — NURSE TRIAGE (OUTPATIENT)
Dept: NURSING | Facility: CLINIC | Age: 54
End: 2019-05-18

## 2019-05-18 NOTE — TELEPHONE ENCOUNTER
Amlodipine refills needed. He states he was at the [place] pharmacy yesterday and they told him they didn't receive a refill on amlodipine. The computer shows it was received by the pharmacy. The pharmacy is closed today and the patient has one pill left.  He wanted the prescription sent to the [facility] on [address] in [place]. I called and gave a verbal order to the pharmacist at the [facility] pharmacy.  Dotty Bernabe RN-[facility]

## 2019-06-06 NOTE — PROGRESS NOTES
"Subjective     Willard Bartlett is a 54 year old male who presents to clinic today for the following health issues:    HPI   Hypertension Follow-up      Do you check your blood pressure regularly outside of the clinic? Yes     Are you following a low salt diet? No    Are your blood pressures ever more than 140 on the top number (systolic) OR more   than 90 on the bottom number (diastolic), for example 140/90? Yes    Amount of exercise or physical activity: 2-3 days/week for an average of greater than 60 minutes    Problems taking medications regularly: No    Medication side effects: none    Diet: regular (no restrictions)    Willard presents today for HTN follow-up visit.  Currently taking amlodipine 10mg and atenolol 50mg and has been taking medications as prescribed.  No reported side effects.  Blood pressure ranges are wnl.  Patient has been exercising on a regular basis and is monitoring sodium intake.     BP Readings from Last 3 Encounters:   06/13/19 122/82   01/08/19 (!) 152/98   01/05/19 (!) 146/95    Wt Readings from Last 3 Encounters:   06/13/19 89.4 kg (197 lb)   01/08/19 90.3 kg (199 lb)   01/05/19 90.3 kg (199 lb)           Reviewed and updated as needed this visit by Provider  Tobacco  Allergies  Meds  Problems  Med Hx  Surg Hx  Fam Hx         Review of Systems   ROS COMP: Constitutional, HEENT, cardiovascular, pulmonary, gi and gu systems are negative, except as otherwise noted.      Objective    /82   Pulse 61   Temp 98  F (36.7  C) (Oral)   Resp 16   Ht 1.778 m (5' 10\")   Wt 89.4 kg (197 lb)   SpO2 99%   BMI 28.27 kg/m     Physical Exam   GENERAL: healthy, alert and no distress  EYES: Eyes grossly normal to inspection, PERRL and conjunctivae and sclerae normal  HENT: ear canals and TM's normal, nose and mouth without ulcers or lesions  NECK: no adenopathy, no asymmetry, masses, or scars and thyroid normal to palpation  RESP: lungs clear to auscultation - no rales, rhonchi or " wheezes  CV: regular rate and rhythm, normal S1 S2, no S3 or S4, no murmur, click or rub, no peripheral edema and peripheral pulses strong  SKIN: no suspicious lesions or rashes  PSYCH: mentation appears normal, affect normal/bright          Assessment & Plan       ICD-10-CM    1. Essential hypertension with goal blood pressure less than 140/90 I10 amLODIPine (NORVASC) 10 MG tablet     atenolol (TENORMIN) 50 MG tablet     Lipid panel reflex to direct LDL Fasting     Comprehensive metabolic panel   2. Hyperlipidemia LDL goal <100 E78.5 Lipid panel reflex to direct LDL Fasting     Comprehensive metabolic panel   3. BMI 28.0-28.9,adult Z68.28      Monitor sodium intake, regular exercise, monitor bp weekly  Medication refills  Patient requests lab workup as he has made significant lifestyle changes, specifically he has stopped drinking, and has cut his portion sizes, he exercises frequently during the week as well.         Follow-up in 6 months for physical    Sohan Mayo MD  [facility]

## 2019-06-13 ENCOUNTER — OFFICE VISIT (OUTPATIENT)
Dept: FAMILY MEDICINE | Facility: CLINIC | Age: 54
End: 2019-06-13
Payer: COMMERCIAL

## 2019-06-13 VITALS
BODY MASS INDEX: 28.2 KG/M2 | WEIGHT: 197 LBS | TEMPERATURE: 98 F | HEART RATE: 61 BPM | HEIGHT: 70 IN | RESPIRATION RATE: 16 BRPM | SYSTOLIC BLOOD PRESSURE: 122 MMHG | OXYGEN SATURATION: 99 % | DIASTOLIC BLOOD PRESSURE: 82 MMHG

## 2019-06-13 DIAGNOSIS — I10 ESSENTIAL HYPERTENSION WITH GOAL BLOOD PRESSURE LESS THAN 140/90: Primary | ICD-10-CM

## 2019-06-13 DIAGNOSIS — E78.5 HYPERLIPIDEMIA LDL GOAL <100: ICD-10-CM

## 2019-06-13 LAB
ALBUMIN SERPL-MCNC: 4.3 G/DL (ref 3.4–5)
ALP SERPL-CCNC: 58 U/L (ref 40–150)
ALT SERPL W P-5'-P-CCNC: 39 U/L (ref 0–70)
ANION GAP SERPL CALCULATED.3IONS-SCNC: 4 MMOL/L (ref 3–14)
AST SERPL W P-5'-P-CCNC: 24 U/L (ref 0–45)
BILIRUB SERPL-MCNC: 0.6 MG/DL (ref 0.2–1.3)
BUN SERPL-MCNC: 16 MG/DL (ref 7–30)
CALCIUM SERPL-MCNC: 9.2 MG/DL (ref 8.5–10.1)
CHLORIDE SERPL-SCNC: 105 MMOL/L (ref 94–109)
CHOLEST SERPL-MCNC: 160 MG/DL
CO2 SERPL-SCNC: 29 MMOL/L (ref 20–32)
CREAT SERPL-MCNC: 0.9 MG/DL (ref 0.66–1.25)
GFR SERPL CREATININE-BSD FRML MDRD: >90 ML/MIN/{1.73_M2}
GLUCOSE SERPL-MCNC: 105 MG/DL (ref 70–99)
HDLC SERPL-MCNC: 34 MG/DL
LDLC SERPL CALC-MCNC: 101 MG/DL
NONHDLC SERPL-MCNC: 126 MG/DL
POTASSIUM SERPL-SCNC: 4.8 MMOL/L (ref 3.4–5.3)
PROT SERPL-MCNC: 8.5 G/DL (ref 6.8–8.8)
SODIUM SERPL-SCNC: 138 MMOL/L (ref 133–144)
TRIGL SERPL-MCNC: 123 MG/DL

## 2019-06-13 PROCEDURE — 36415 COLL VENOUS BLD VENIPUNCTURE: CPT | Performed by: FAMILY MEDICINE

## 2019-06-13 PROCEDURE — 80061 LIPID PANEL: CPT | Performed by: FAMILY MEDICINE

## 2019-06-13 PROCEDURE — 99214 OFFICE O/P EST MOD 30 MIN: CPT | Performed by: FAMILY MEDICINE

## 2019-06-13 PROCEDURE — 80053 COMPREHEN METABOLIC PANEL: CPT | Performed by: FAMILY MEDICINE

## 2019-06-13 RX ORDER — AMLODIPINE BESYLATE 10 MG/1
10 TABLET ORAL DAILY
Qty: 90 TABLET | Refills: 3 | Status: SHIPPED | OUTPATIENT
Start: 2019-06-13 | End: 2020-07-06

## 2019-06-13 RX ORDER — ATENOLOL 50 MG/1
50 TABLET ORAL DAILY
Qty: 90 TABLET | Refills: 3 | Status: SHIPPED | OUTPATIENT
Start: 2019-06-13 | End: 2020-07-06

## 2019-06-13 ASSESSMENT — MIFFLIN-ST. JEOR: SCORE: 1744.84

## 2019-06-13 NOTE — LETTER
[address] [place] [address]    June 17, 2019    Willard Tri  [address] [place] [id number]          Dear Willard,  Your most recent labs are not concerning at this time.  Your Liver function, kidney function and electrolytes are all normal.  Your cholesterol could use a some improvement as your LDL (bad cholesterol) is just slightly above 100, however your HDL (good cholesterol) is low, which is bad.  You don't need a cholesterol medication at this point, however I would recommend healthy diet changes and weight loss to improve overall cardiovascular health.  Enclosed is a copy of your results.     Results for orders placed or performed in visit on 06/13/19   Lipid panel reflex to direct LDL Fasting   Result Value Ref Range    Cholesterol 160 <200 mg/dL    Triglycerides 123 <150 mg/dL    HDL Cholesterol 34 (L) >39 mg/dL    LDL Cholesterol Calculated 101 (H) <100 mg/dL    Non HDL Cholesterol 126 <130 mg/dL   Comprehensive metabolic panel   Result Value Ref Range    Sodium 138 133 - 144 mmol/L    Potassium 4.8 3.4 - 5.3 mmol/L    Chloride 105 94 - 109 mmol/L    Carbon Dioxide 29 20 - 32 mmol/L    Anion Gap 4 3 - 14 mmol/L    Glucose 105 (H) 70 - 99 mg/dL    Urea Nitrogen 16 7 - 30 mg/dL    Creatinine 0.90 0.66 - 1.25 mg/dL    GFR Estimate >90 >60 mL/min/[1.73_m2]    GFR Estimate If Black >90 >60 mL/min/[1.73_m2]    Calcium 9.2 8.5 - 10.1 mg/dL    Bilirubin Total 0.6 0.2 - 1.3 mg/dL    Albumin 4.3 3.4 - 5.0 g/dL    Protein Total 8.5 6.8 - 8.8 g/dL    Alkaline Phosphatase 58 40 - 150 U/L    ALT 39 0 - 70 U/L    AST 24 0 - 45 U/L       If you have any questions or concerns, please call myself or my nurse at [phone].      Sincerely,        Sohan Herring MD/pb

## 2020-02-23 ENCOUNTER — HEALTH MAINTENANCE LETTER (OUTPATIENT)
Age: 55
End: 2020-02-23

## 2020-07-17 NOTE — PROGRESS NOTES
"Willard Bartlett is a 55 year old male who is being evaluated via a billable telephone visit.      The patient has been notified of following:     \"This telephone visit will be conducted via a call between you and your physician/provider. We have found that certain health care needs can be provided without the need for a physical exam.  This service lets us provide the care you need with a short phone conversation.  If a prescription is necessary we can send it directly to your pharmacy.  If lab work is needed we can place an order for that and you can then stop by our lab to have the test done at a later time.    Telephone visits are billed at different rates depending on your insurance coverage. During this emergency period, for some insurers they may be billed the same as an in-person visit.  Please reach out to your insurance provider with any questions.    If during the course of the call the physician/provider feels a telephone visit is not appropriate, you will not be charged for this service.\"    Patient has given verbal consent for Telephone visit?  Yes    What phone number would you like to be contacted at? [phone]    How would you like to obtain your AVS? MyChart  1:45 PM  Start vist    Subjective     Willard Bartlett is a 55 year old male who presents via phone visit today for the following health issues:    HPI    Hypertension Follow-up      Do you check your blood pressure regularly outside of the clinic? Yes     Are you following a low salt diet? Yes    Are your blood pressures ever more than 140 on the top number (systolic) OR more   than 90 on the bottom number (diastolic), for example 140/90? Yes      How many servings of fruits and vegetables do you eat daily?  2-3    On average, how many sweetened beverages do you drink each day (Examples: soda, juice, sweet tea, etc.  Do NOT count diet or artificially sweetened beverages)?   0    How many days per week do you exercise enough to make your " heart beat faster? 7    How many minutes a day do you exercise enough to make your heart beat faster? 60 or more    How many days per week do you miss taking your medication? 0    Patient Active Problem List   Diagnosis     Hypertriglyceridemia     Neurocysticercosis     CARDIOVASCULAR SCREENING; LDL GOAL LESS THAN 130     Ex-smoker     DDD (degenerative disc disease), lumbar     Essential hypertension with goal blood pressure less than 140/90     Impaired glucose tolerance     Past Surgical History:   Procedure Laterality Date     COLONOSCOPY WITH CO2 INSUFFLATION N/A 2016    Procedure: COLONOSCOPY WITH CO2 INSUFFLATION;  Surgeon: Duane, William Charles, MD;  Location:  OR     NO HISTORY OF SURGERY         Social History     Tobacco Use     Smoking status: Former Smoker     Packs/day: 0.50     Types: Cigarettes     Last attempt to quit: 3/12/2012     Years since quittin.3     Smokeless tobacco: Never Used   Substance Use Topics     Alcohol use: No     Alcohol/week: 0.0 standard drinks     Comment: prior use of 2-3 whiskey 4 days a week, quit 2012     Family History   Problem Relation Age of Onset     C.A.D. Father 68        MI, dZoie 68     Hypertension Father      Hypertension Mother      Cerebrovascular Disease Mother      Hypertension Brother      Cancer Brother      Hypertension Brother          Current Outpatient Medications   Medication Sig Dispense Refill     amLODIPine (NORVASC) 10 MG tablet Take 1 tablet (10 mg) by mouth daily 30 tablet 0     atenolol (TENORMIN) 50 MG tablet Take 1 tablet (50 mg) by mouth daily 30 tablet 0     No Known Allergies  Recent Labs   Lab Test 19  0953 19  1258 17  1619 16  0825 16  0848 16  1317  02/23/15  1406   A1C  --   --  6.0  --   --  6.3*  --   --    * 150*  --   --  86  --    < >  --    HDL 34* 33*  --   --  28*  --    < >  --    TRIG 123 138  --   --  134  --    < >  --    ALT 39  --   --  40  --   --   --  34   CR 0.90  0.94 0.88  --   --  0.93   < > 1.17   GFRESTIMATED >90 >90 >90  --   --  86   < > 66   GFRESTBLACK >90 >90 >90  --   --  >90  African American GFR Calc     < > 80   POTASSIUM 4.8 4.3 4.3  --   --  4.6   < > 3.8   TSH  --   --   --   --   --   --   --  1.56    < > = values in this interval not displayed.      BP Readings from Last 3 Encounters:   06/13/19 122/82   01/08/19 (!) 152/98   01/05/19 (!) 146/95    Wt Readings from Last 3 Encounters:   06/13/19 89.4 kg (197 lb)   01/08/19 90.3 kg (199 lb)   01/05/19 90.3 kg (199 lb)                    Reviewed and updated as needed this visit by Provider         Review of Systems   CONSTITUTIONAL: NEGATIVE for fever, chills, change in weight  ENT/MOUTH: NEGATIVE for ear, mouth and throat problems  RESP: NEGATIVE for significant cough or SOB  CV: NEGATIVE for chest pa  in, palpitations or peripheral edema  GI: NEGATIVE for nausea, abdominal pain, heartburn, or change in bowel habits       Objective    120-135/86-96  HR-81  Reported vitals:  There were no vitals taken for this visit.   healthy, alert and no distress  PSYCH: Alert and oriented times 3; coherent speech, normal   rate and volume, able to articulate logical thoughts, able   to abstract reason, no tangential thoughts, no hallucinations   or delusions  His affect is normal  RESP: No cough, no audible wheezing, able to talk in full sentences  Remainder of exam unable to be completed due to telephone visits    Diagnostic Test Results:  Labs reviewed in Epic        Assessment/Plan:    .(I10) Essential hypertension with goal blood pressure less than 140/90  Comment: low salt diet  Plan: amLODIPine (NORVASC) 10 MG tablet, atenolol         (TENORMIN) 50 MG tablet, Basic metabolic panel,        Lipid panel reflex to direct LDL Fasting        Refilled  Follow up bp Check in clin    1:54 PM  End visit    Follow up 1 month receheck  Phone call duration:  As above/ minutes    Laure Doan MD

## 2020-07-20 ENCOUNTER — VIRTUAL VISIT (OUTPATIENT)
Dept: FAMILY MEDICINE | Facility: CLINIC | Age: 55
End: 2020-07-20
Payer: COMMERCIAL

## 2020-07-20 DIAGNOSIS — I10 ESSENTIAL HYPERTENSION WITH GOAL BLOOD PRESSURE LESS THAN 140/90: ICD-10-CM

## 2020-07-20 PROCEDURE — 99213 OFFICE O/P EST LOW 20 MIN: CPT | Mod: 95 | Performed by: FAMILY MEDICINE

## 2020-07-20 RX ORDER — ATENOLOL 50 MG/1
50 TABLET ORAL DAILY
Qty: 30 TABLET | Refills: 0 | Status: SHIPPED | OUTPATIENT
Start: 2020-07-20 | End: 2020-08-18

## 2020-07-20 RX ORDER — AMLODIPINE BESYLATE 10 MG/1
10 TABLET ORAL DAILY
Qty: 30 TABLET | Refills: 0 | Status: SHIPPED | OUTPATIENT
Start: 2020-07-20 | End: 2020-08-18

## 2020-08-14 ENCOUNTER — ALLIED HEALTH/NURSE VISIT (OUTPATIENT)
Dept: NURSING | Facility: CLINIC | Age: 55
End: 2020-08-14
Payer: COMMERCIAL

## 2020-08-14 VITALS — SYSTOLIC BLOOD PRESSURE: 116 MMHG | DIASTOLIC BLOOD PRESSURE: 84 MMHG

## 2020-08-14 DIAGNOSIS — I10 ESSENTIAL HYPERTENSION WITH GOAL BLOOD PRESSURE LESS THAN 140/90: Primary | ICD-10-CM

## 2020-08-14 DIAGNOSIS — I10 ESSENTIAL HYPERTENSION WITH GOAL BLOOD PRESSURE LESS THAN 140/90: ICD-10-CM

## 2020-08-14 LAB
ANION GAP SERPL CALCULATED.3IONS-SCNC: 3 MMOL/L (ref 3–14)
BUN SERPL-MCNC: 22 MG/DL (ref 7–30)
CALCIUM SERPL-MCNC: 8.9 MG/DL (ref 8.5–10.1)
CHLORIDE SERPL-SCNC: 108 MMOL/L (ref 94–109)
CHOLEST SERPL-MCNC: 141 MG/DL
CO2 SERPL-SCNC: 29 MMOL/L (ref 20–32)
CREAT SERPL-MCNC: 0.94 MG/DL (ref 0.66–1.25)
GFR SERPL CREATININE-BSD FRML MDRD: >90 ML/MIN/{1.73_M2}
GLUCOSE SERPL-MCNC: 100 MG/DL (ref 70–99)
HDLC SERPL-MCNC: 33 MG/DL
LDLC SERPL CALC-MCNC: 93 MG/DL
NONHDLC SERPL-MCNC: 108 MG/DL
POTASSIUM SERPL-SCNC: 4.2 MMOL/L (ref 3.4–5.3)
SODIUM SERPL-SCNC: 140 MMOL/L (ref 133–144)
TRIGL SERPL-MCNC: 74 MG/DL

## 2020-08-14 PROCEDURE — 80061 LIPID PANEL: CPT | Performed by: FAMILY MEDICINE

## 2020-08-14 PROCEDURE — 80048 BASIC METABOLIC PNL TOTAL CA: CPT | Performed by: FAMILY MEDICINE

## 2020-08-14 PROCEDURE — 36415 COLL VENOUS BLD VENIPUNCTURE: CPT | Performed by: FAMILY MEDICINE

## 2020-08-18 DIAGNOSIS — I10 ESSENTIAL HYPERTENSION WITH GOAL BLOOD PRESSURE LESS THAN 140/90: ICD-10-CM

## 2020-08-18 RX ORDER — ATENOLOL 50 MG/1
50 TABLET ORAL DAILY
Qty: 90 TABLET | Refills: 3 | Status: SHIPPED | OUTPATIENT
Start: 2020-08-18 | End: 2020-12-24

## 2020-08-18 RX ORDER — AMLODIPINE BESYLATE 10 MG/1
10 TABLET ORAL DAILY
Qty: 90 TABLET | Refills: 3 | Status: SHIPPED | OUTPATIENT
Start: 2020-08-18 | End: 2020-12-24

## 2020-08-18 NOTE — TELEPHONE ENCOUNTER
Left message for patient to call RN hotline [phone].   Advised [place] message also sent with results & message.     Sallie Chris RN    ----- Message from OMKAR Browne CNP sent at 8/14/2020  3:31 PM CDT -----  Please call if patient does not view results.  -Cholesterol levels (LDL,HDL, Triglycerides) are okay.  ADVISE: rechecking  in 1 year.   -Kidney function (GFR) is normal.   -Sodium is normal.   -Potassium is normal.   -Calcium is normal.   -Glucose is slight elevated and may be a sign of early diabetes (prediabetes). ADVISE:: eating a low carbohydrate diet, exercising, trying to lose weight (if necessary) and rechecking your glucose level in 12 months.      JOHNATHON Raya RN

## 2020-08-18 NOTE — TELEPHONE ENCOUNTER
Patient called clinic and was notified of lab result message. He verbalized understanding and had no further questions. Pt requests that refills of his medication be sent to [facility] pharmacy in [place]. Refills sent.

## 2020-12-12 ENCOUNTER — HEALTH MAINTENANCE LETTER (OUTPATIENT)
Age: 55
End: 2020-12-12

## 2020-12-24 ENCOUNTER — OFFICE VISIT (OUTPATIENT)
Dept: FAMILY MEDICINE | Facility: CLINIC | Age: 55
End: 2020-12-24
Payer: COMMERCIAL

## 2020-12-24 VITALS
WEIGHT: 197 LBS | DIASTOLIC BLOOD PRESSURE: 80 MMHG | BODY MASS INDEX: 28.2 KG/M2 | RESPIRATION RATE: 14 BRPM | OXYGEN SATURATION: 97 % | HEIGHT: 70 IN | TEMPERATURE: 97.5 F | HEART RATE: 65 BPM | SYSTOLIC BLOOD PRESSURE: 130 MMHG

## 2020-12-24 DIAGNOSIS — Z87.891 EX-SMOKER: ICD-10-CM

## 2020-12-24 DIAGNOSIS — I10 ESSENTIAL HYPERTENSION WITH GOAL BLOOD PRESSURE LESS THAN 140/90: ICD-10-CM

## 2020-12-24 DIAGNOSIS — N39.0 URINARY TRACT INFECTION WITHOUT HEMATURIA, SITE UNSPECIFIED: ICD-10-CM

## 2020-12-24 DIAGNOSIS — Z23 NEED FOR PROPHYLACTIC VACCINATION AND INOCULATION AGAINST INFLUENZA: Primary | ICD-10-CM

## 2020-12-24 DIAGNOSIS — Z11.59 ENCOUNTER FOR HEPATITIS C SCREENING TEST FOR LOW RISK PATIENT: ICD-10-CM

## 2020-12-24 DIAGNOSIS — R73.02 IMPAIRED GLUCOSE TOLERANCE: ICD-10-CM

## 2020-12-24 LAB
ALBUMIN UR-MCNC: NEGATIVE MG/DL
APPEARANCE UR: CLEAR
BILIRUB UR QL STRIP: NEGATIVE
COLOR UR AUTO: YELLOW
GLUCOSE UR STRIP-MCNC: NEGATIVE MG/DL
HGB UR QL STRIP: NEGATIVE
KETONES UR STRIP-MCNC: NEGATIVE MG/DL
LEUKOCYTE ESTERASE UR QL STRIP: NEGATIVE
NITRATE UR QL: NEGATIVE
PH UR STRIP: 5.5 PH (ref 5–7)
SOURCE: NORMAL
SP GR UR STRIP: >1.03 (ref 1–1.03)
UROBILINOGEN UR STRIP-ACNC: 0.2 EU/DL (ref 0.2–1)

## 2020-12-24 PROCEDURE — 90715 TDAP VACCINE 7 YRS/> IM: CPT | Performed by: FAMILY MEDICINE

## 2020-12-24 PROCEDURE — 36415 COLL VENOUS BLD VENIPUNCTURE: CPT | Performed by: FAMILY MEDICINE

## 2020-12-24 PROCEDURE — 90471 IMMUNIZATION ADMIN: CPT | Performed by: FAMILY MEDICINE

## 2020-12-24 PROCEDURE — 90682 RIV4 VACC RECOMBINANT DNA IM: CPT | Performed by: FAMILY MEDICINE

## 2020-12-24 PROCEDURE — 99213 OFFICE O/P EST LOW 20 MIN: CPT | Mod: 25 | Performed by: FAMILY MEDICINE

## 2020-12-24 PROCEDURE — 90472 IMMUNIZATION ADMIN EACH ADD: CPT | Performed by: FAMILY MEDICINE

## 2020-12-24 PROCEDURE — 81003 URINALYSIS AUTO W/O SCOPE: CPT | Performed by: FAMILY MEDICINE

## 2020-12-24 PROCEDURE — 87522 HEPATITIS C REVRS TRNSCRPJ: CPT | Performed by: FAMILY MEDICINE

## 2020-12-24 RX ORDER — AMLODIPINE BESYLATE 10 MG/1
10 TABLET ORAL DAILY
Qty: 90 TABLET | Refills: 3 | Status: SHIPPED | OUTPATIENT
Start: 2020-12-24 | End: 2022-03-03

## 2020-12-24 RX ORDER — ATENOLOL 50 MG/1
50 TABLET ORAL DAILY
Qty: 90 TABLET | Refills: 3 | Status: SHIPPED | OUTPATIENT
Start: 2020-12-24 | End: 2022-03-03

## 2020-12-24 ASSESSMENT — MIFFLIN-ST. JEOR: SCORE: 1739.84

## 2020-12-24 NOTE — PROGRESS NOTES
"Subjective     Willard Bartlett is a 55 year old male who presents to clinic today for the following health issues:    HPI         ED/UC Followup:    Facility:  [facility]  Date of visit: 12/04/20  Reason for visit: uti and abdominal  Current Status: better     Pt has No hematuria or abdominal pain  No Urinary Frequency  Hypertension Follow-up      Do you check your blood pressure regularly outside of the clinic? No     Are you following a low salt diet? Yes    Are your blood pressures ever more than 140 on the top number (systolic) OR more   than 90 on the bottom number (diastolic), for example 140/90? No    Review of Systems   CONSTITUTIONAL: NEGATIVE for fever, chills, change in weight  ENT/MOUTH: NEGATIVE for ear, mouth and throat problems  RESP: NEGATIVE for significant cough or SOB  CV: NEGATIVE for chest pain, palpitations or peripheral edema  GI: NEGATIVE for nausea, abdominal pain, heartburn, or change in bowel habits  : as above  ROS otherwise negative      Objective    /80   Pulse 65   Temp 97.5  F (36.4  C)   Resp 14   Ht 1.778 m (5' 10\")   Wt 89.4 kg (197 lb)   SpO2 97%   BMI 28.27 kg/m    Body mass index is 28.27 kg/m .  Physical Exam   GENERAL: healthy, alert and no distress  NECK: no adenopathy, no asymmetry, masses, or scars and thyroid normal to palpation  RESP: lungs clear to auscultation - no rales, rhonchi or wheezes  CV: regular rate and rhythm, normal S1 S2, no S3 or S4, no murmur, click or rub, no peripheral edema and peripheral pulses strong  ABDOMEN: soft, nontender, no hepatosplenomegaly, no masses and bowel sounds normal  MS: no gross musculoskeletal defects noted, no edema    Orders Only on 08/14/2020   Component Date Value Ref Range Status     Cholesterol 08/14/2020 141  <200 mg/dL Final     Triglycerides 08/14/2020 74  <150 mg/dL Final    Fasting specimen     HDL Cholesterol 08/14/2020 33* >39 mg/dL Final     LDL Cholesterol Calculated 08/14/2020 93  <100 " "mg/dL Final    Desirable:       <100 mg/dl     Non HDL Cholesterol 08/14/2020 108  <130 mg/dL Final     Sodium 08/14/2020 140  133 - 144 mmol/L Final     Potassium 08/14/2020 4.2  3.4 - 5.3 mmol/L Final     Chloride 08/14/2020 108  94 - 109 mmol/L Final     Carbon Dioxide 08/14/2020 29  20 - 32 mmol/L Final     Anion Gap 08/14/2020 3  3 - 14 mmol/L Final     Glucose 08/14/2020 100* 70 - 99 mg/dL Final    Fasting specimen     Urea Nitrogen 08/14/2020 22  7 - 30 mg/dL Final     Creatinine 08/14/2020 0.94  0.66 - 1.25 mg/dL Final     GFR Estimate 08/14/2020 >90  >60 mL/min/[1.73_m2] Final    Comment: Non  GFR Calc  Starting 12/18/2018, serum creatinine based estimated GFR (eGFR) will be   calculated using the Chronic Kidney Disease Epidemiology Collaboration   (CKD-EPI) equation.       GFR Estimate If Black 08/14/2020 >90  >60 mL/min/[1.73_m2] Final    Comment:  GFR Calc  Starting 12/18/2018, serum creatinine based estimated GFR (eGFR) will be   calculated using the Chronic Kidney Disease Epidemiology Collaboration   (CKD-EPI) equation.       Calcium 08/14/2020 8.9  8.5 - 10.1 mg/dL Final           Assessment & Plan     Essential hypertension with goal blood pressure less than 140/90  controlld  - atenolol (TENORMIN) 50 MG tablet; Take 1 tablet (50 mg) by mouth daily  - amLODIPine (NORVASC) 10 MG tablet; Take 1 tablet (10 mg) by mouth daily    Ex-smoker      Impaired glucose tolerance  Advised watch diet    Urinary tract infection without hematuria, site unspecified  better  - *UA reflex to Microscopic and Culture ([place] [facility] (except [place] and [place])  - UROLOGY ADULT REFERRAL; Future  Referral done  Encounter for hepatitis C screening test for low risk patient    - Hepatitis C RNA quantitative     BMI:   Estimated body mass index is 28.27 kg/m  as calculated from the following:    Height as of this encounter: 1.778 m (5' 10\").    Weight as of this encounter: 89.4 " kg (197 lb).   Weight management plan: Discussed healthy diet and exercise guidelines       Pt is Moving to [place]  See Patient Instruction    Return in about 1 month (around 1/24/2021) for Physical Exam.    Laure Doan MD  [facility]

## 2020-12-24 NOTE — PATIENT INSTRUCTIONS
[facility]    If you have any questions regarding to your visit please contact your care team:       Team Red:   Clinic Hours Telephone Number   [address] 7am-7pm  Monday - Thursday   7am-5pm  Fridays  [phone]  (Appointment scheduling available 24/7)    Questions about your recent visit?   Team Line  [phone]   Urgent Care - [place] and [facility] - 11am-9pm Monday-Friday Saturday-Sunday- 9am-5pm   [place] - 5pm-9pm Monday-Friday Saturday-Sunday- 9am-5pm  [phone] - [place]  [phone] - [place]       What options do I have for a visit other than an office visit? We offer electronic visits (e-visits) and telephone visits, when medically appropriate.  Please check with your medical insurance to see if these types of visits are covered, as you will be responsible for any charges that are not paid by your insurance.      You can use [employer] (secure electronic communication) to access to your chart, send your primary care provider a message, or make an appointment. Ask a team member how to get started.     For a price quote for your services, please call our Consumer Price Line at [phone] or our Imaging Cost estimation line at [phone] (for imaging tests).

## 2020-12-28 LAB
HCV RNA SERPL NAA+PROBE-ACNC: NORMAL [IU]/ML
HCV RNA SERPL NAA+PROBE-LOG IU: NORMAL LOG IU/ML

## 2022-02-27 DIAGNOSIS — I10 ESSENTIAL HYPERTENSION WITH GOAL BLOOD PRESSURE LESS THAN 140/90: ICD-10-CM

## 2022-02-28 ENCOUNTER — TELEPHONE (OUTPATIENT)
Dept: FAMILY MEDICINE | Facility: CLINIC | Age: 57
End: 2022-02-28

## 2022-02-28 DIAGNOSIS — I10 ESSENTIAL HYPERTENSION WITH GOAL BLOOD PRESSURE LESS THAN 140/90: ICD-10-CM

## 2022-03-03 RX ORDER — AMLODIPINE BESYLATE 10 MG/1
10 TABLET ORAL DAILY
Qty: 90 TABLET | Refills: 3 | Status: SHIPPED | OUTPATIENT
Start: 2022-03-03

## 2022-03-03 RX ORDER — ATENOLOL 50 MG/1
50 TABLET ORAL DAILY
Qty: 90 TABLET | Refills: 3 | Status: SHIPPED | OUTPATIENT
Start: 2022-03-03

## 2022-03-03 NOTE — TELEPHONE ENCOUNTER
Routing refill request to provider for review/approval because:  BP  Appointment needed    BP Readings from Last 3 Encounters:   12/24/20 130/80   08/14/20 116/84   06/13/19 122/82              Pending Prescriptions:                       Disp   Refills    atenolol (TENORMIN) 50 MG tablet           90 tab*3        Sig: Take 1 tablet (50 mg) by mouth daily    amLODIPine (NORVASC) 10 MG tablet          90 tab*3        Sig: Take 1 tablet (10 mg) by mouth daily        Curt Og RN          
Principal Discharge DX:	Viral syndrome

## 2022-07-08 ENCOUNTER — TELEPHONE (OUTPATIENT)
Dept: FAMILY MEDICINE | Facility: CLINIC | Age: 57
End: 2022-07-08

## 2022-07-08 NOTE — TELEPHONE ENCOUNTER
Patient called the clinic requesting refills for amLODIPine (NORVASC) 10 MG tablet and atenolol (TENORMIN) 50 MG tablet.  Patient states that he moved to [place] 2 years ago.  Patient was informed that he has refills due: 3/3/23.  Patient will call back with additional questions and concerns regarding the refills.  Patient verbalized understanding and has no further questions or concerns at this time.          amLODIPine (NORVASC) 10 MG tablet 90 tablet 3 3/3/2022  No   Sig - Route: Take 1 tablet (10 mg) by mouth daily - Oral   Sent to pharmacy as: amLODIPine Besylate 10 MG Oral Tablet (NORVASC)   Class: E-Prescribe   Order: [id number]   E-Prescribing Status: Receipt confirmed by pharmacy (3/3/2022  5:03 PM CST)   No prior authorization was found for this prescription.   Found prior authorization for another prescription for the same medication: Closed - Prior Authorization not required for patient/medication     atenolol (TENORMIN) 50 MG tablet 90 tablet 3 3/3/2022  No   Sig - Route: Take 1 tablet (50 mg) by mouth daily - Oral   Sent to pharmacy as: Atenolol 50 MG Oral Tablet (TENORMIN)   Class: E-Prescribe   Order: [id number]   E-Prescribing Status: Receipt confirmed by pharmacy (3/3/2022  5:03 PM CST)

## 2022-07-09 ENCOUNTER — TELEPHONE (OUTPATIENT)
Dept: NURSING | Facility: CLINIC | Age: 57
End: 2022-07-09

## 2022-07-09 DIAGNOSIS — I10 HYPERTENSION: Primary | ICD-10-CM

## 2022-07-09 RX ORDER — ATENOLOL 50 MG/1
50 TABLET ORAL DAILY
Qty: 14 TABLET | Refills: 0 | Status: SHIPPED | OUTPATIENT
Start: 2022-07-09 | End: 2022-07-23

## 2022-07-09 RX ORDER — AMLODIPINE BESYLATE 10 MG/1
10 TABLET ORAL DAILY
Qty: 14 TABLET | Refills: 0 | Status: SHIPPED | OUTPATIENT
Start: 2022-07-09 | End: 2022-07-23

## 2022-07-09 NOTE — TELEPHONE ENCOUNTER
Mail order pharmacy has experienced a supply chain issue and patient needs a 2 week bridge of his BP meds (amlodipine and atenolol). Paged on call for Dr. Ruth [place] and she OKed a 2 week bridge supply.  Lorenza Saini RN  [facility]

## 2022-07-20 DIAGNOSIS — I10 HYPERTENSION: ICD-10-CM

## 2022-07-20 RX ORDER — AMLODIPINE BESYLATE 10 MG/1
TABLET ORAL
Qty: 14 TABLET | Refills: 0 | OUTPATIENT
Start: 2022-07-20

## 2022-07-20 RX ORDER — ATENOLOL 50 MG/1
TABLET ORAL
Qty: 14 TABLET | Refills: 0 | OUTPATIENT
Start: 2022-07-20

## 2022-07-21 NOTE — TELEPHONE ENCOUNTER
Ruth only sent refill on 7/9/22 to bridge until pt gets refills from mail order. Additional refill not appropriate.

## 2023-04-19 RX ORDER — AMLODIPINE BESYLATE 10 MG/1
TABLET ORAL
Qty: 90 TABLET | Refills: 2 | OUTPATIENT
Start: 2023-04-19

## 2023-04-19 RX ORDER — ATENOLOL 50 MG/1
TABLET ORAL
Qty: 90 TABLET | Refills: 2 | OUTPATIENT
Start: 2023-04-19

## 2023-04-20 NOTE — TELEPHONE ENCOUNTER
1st attempt: Called and left message for patient to return call to clinic.     -if patient return call to clinic please assist patient with scheduling for further refill.    Sienna Harper, CMA

## 2023-04-24 NOTE — TELEPHONE ENCOUNTER
Called patient informed him of message below patient states he is in [place] so he will call back at a later to schedule an appointment when he is ready.     Thank you  LS